# Patient Record
Sex: FEMALE | Race: WHITE | Employment: OTHER | URBAN - METROPOLITAN AREA
[De-identification: names, ages, dates, MRNs, and addresses within clinical notes are randomized per-mention and may not be internally consistent; named-entity substitution may affect disease eponyms.]

---

## 2017-03-13 ENCOUNTER — HOSPITAL ENCOUNTER (OUTPATIENT)
Age: 72
Setting detail: SPECIMEN
Discharge: HOME | End: 2017-03-13
Payer: MEDICARE

## 2017-03-13 LAB
-: NORMAL
AMORPHOUS: NORMAL
BACTERIA: NORMAL
BILIRUBIN URINE: NEGATIVE
CASTS UA: NORMAL /LPF (ref 0–8)
COLOR: YELLOW
COMMENT UA: ABNORMAL
CRYSTALS, UA: NORMAL /HPF
EPITHELIAL CELLS UA: NORMAL /HPF (ref 0–5)
GLUCOSE URINE: ABNORMAL
KETONES, URINE: NEGATIVE
LEUKOCYTE ESTERASE, URINE: ABNORMAL
MUCUS: NORMAL
NITRITE, URINE: NEGATIVE
OTHER OBSERVATIONS UA: NORMAL
PH UA: 5 (ref 5–8)
PROTEIN UA: NEGATIVE
RBC UA: NORMAL /HPF (ref 0–4)
RENAL EPITHELIAL, UA: NORMAL /HPF
SPECIFIC GRAVITY UA: 1.01 (ref 1–1.03)
TRICHOMONAS: NORMAL
TURBIDITY: ABNORMAL
URINE HGB: NEGATIVE
UROBILINOGEN, URINE: NORMAL
WBC UA: NORMAL /HPF (ref 0–5)
YEAST: NORMAL

## 2017-03-16 LAB
CULTURE: ABNORMAL
CULTURE: ABNORMAL
Lab: ABNORMAL
ORGANISM: ABNORMAL
SPECIMEN DESCRIPTION: ABNORMAL
STATUS: ABNORMAL

## 2020-02-26 ENCOUNTER — HOSPITAL ENCOUNTER (OUTPATIENT)
Dept: PREADMISSION TESTING | Age: 75
Discharge: HOME | End: 2020-03-01
Payer: MEDICARE

## 2020-02-26 VITALS
SYSTOLIC BLOOD PRESSURE: 135 MMHG | TEMPERATURE: 98.2 F | HEART RATE: 68 BPM | OXYGEN SATURATION: 98 % | DIASTOLIC BLOOD PRESSURE: 83 MMHG | RESPIRATION RATE: 16 BRPM | HEIGHT: 60 IN | WEIGHT: 190 LBS | BODY MASS INDEX: 37.3 KG/M2

## 2020-02-26 LAB
ABSOLUTE EOS #: 0.2 K/UL (ref 0–0.4)
ABSOLUTE IMMATURE GRANULOCYTE: ABNORMAL K/UL (ref 0–0.3)
ABSOLUTE LYMPH #: 1.3 K/UL (ref 1–4.8)
ABSOLUTE MONO #: 0.7 K/UL (ref 0.1–1.3)
ALBUMIN SERPL-MCNC: 4.4 G/DL (ref 3.5–5.2)
ALBUMIN/GLOBULIN RATIO: ABNORMAL (ref 1–2.5)
ALP BLD-CCNC: 136 U/L (ref 35–104)
ALT SERPL-CCNC: 9 U/L (ref 5–33)
AMYLASE: 69 U/L (ref 28–100)
ANION GAP SERPL CALCULATED.3IONS-SCNC: 13 MMOL/L (ref 9–17)
AST SERPL-CCNC: 16 U/L
BASOPHILS # BLD: 1 % (ref 0–2)
BASOPHILS ABSOLUTE: 0.1 K/UL (ref 0–0.2)
BILIRUB SERPL-MCNC: 0.4 MG/DL (ref 0.3–1.2)
BILIRUBIN DIRECT: 0.1 MG/DL
BILIRUBIN, INDIRECT: 0.3 MG/DL (ref 0–1)
BUN BLDV-MCNC: 26 MG/DL (ref 8–23)
BUN/CREAT BLD: ABNORMAL (ref 9–20)
CALCIUM SERPL-MCNC: 9.7 MG/DL (ref 8.6–10.4)
CHLORIDE BLD-SCNC: 99 MMOL/L (ref 98–107)
CO2: 23 MMOL/L (ref 20–31)
CREAT SERPL-MCNC: 1.32 MG/DL (ref 0.5–0.9)
DIFFERENTIAL TYPE: ABNORMAL
EOSINOPHILS RELATIVE PERCENT: 2 % (ref 0–4)
GFR AFRICAN AMERICAN: 48 ML/MIN
GFR NON-AFRICAN AMERICAN: 39 ML/MIN
GFR SERPL CREATININE-BSD FRML MDRD: ABNORMAL ML/MIN/{1.73_M2}
GFR SERPL CREATININE-BSD FRML MDRD: ABNORMAL ML/MIN/{1.73_M2}
GLOBULIN: ABNORMAL G/DL (ref 1.5–3.8)
GLUCOSE BLD-MCNC: 188 MG/DL (ref 70–99)
HCT VFR BLD CALC: 36.9 % (ref 36–46)
HEMOGLOBIN: 12.3 G/DL (ref 12–16)
IMMATURE GRANULOCYTES: ABNORMAL %
LIPASE: 54 U/L (ref 13–60)
LYMPHOCYTES # BLD: 14 % (ref 24–44)
MCH RBC QN AUTO: 29.6 PG (ref 26–34)
MCHC RBC AUTO-ENTMCNC: 33.2 G/DL (ref 31–37)
MCV RBC AUTO: 89.1 FL (ref 80–100)
MONOCYTES # BLD: 7 % (ref 1–7)
NRBC AUTOMATED: ABNORMAL PER 100 WBC
PDW BLD-RTO: 13.4 % (ref 11.5–14.9)
PLATELET # BLD: 406 K/UL (ref 150–450)
PLATELET ESTIMATE: ABNORMAL
PMV BLD AUTO: 7.6 FL (ref 6–12)
POTASSIUM SERPL-SCNC: 4.8 MMOL/L (ref 3.7–5.3)
RBC # BLD: 4.14 M/UL (ref 4–5.2)
RBC # BLD: ABNORMAL 10*6/UL
SEG NEUTROPHILS: 76 % (ref 36–66)
SEGMENTED NEUTROPHILS ABSOLUTE COUNT: 7.3 K/UL (ref 1.3–9.1)
SODIUM BLD-SCNC: 135 MMOL/L (ref 135–144)
TOTAL PROTEIN: 7.8 G/DL (ref 6.4–8.3)
WBC # BLD: 9.6 K/UL (ref 3.5–11)
WBC # BLD: ABNORMAL 10*3/UL

## 2020-02-26 PROCEDURE — 36415 COLL VENOUS BLD VENIPUNCTURE: CPT

## 2020-02-26 PROCEDURE — 82150 ASSAY OF AMYLASE: CPT

## 2020-02-26 PROCEDURE — 85025 COMPLETE CBC W/AUTO DIFF WBC: CPT

## 2020-02-26 PROCEDURE — 83690 ASSAY OF LIPASE: CPT

## 2020-02-26 PROCEDURE — 93005 ELECTROCARDIOGRAM TRACING: CPT | Performed by: SURGERY

## 2020-02-26 PROCEDURE — 80076 HEPATIC FUNCTION PANEL: CPT

## 2020-02-26 PROCEDURE — 80048 BASIC METABOLIC PNL TOTAL CA: CPT

## 2020-02-26 RX ORDER — TRIAMTERENE AND HYDROCHLOROTHIAZIDE 75; 50 MG/1; MG/1
1 TABLET ORAL DAILY
COMMUNITY
End: 2021-09-20

## 2020-02-26 RX ORDER — ESCITALOPRAM OXALATE 20 MG/1
20 TABLET ORAL DAILY
COMMUNITY

## 2020-02-26 RX ORDER — AMLODIPINE BESYLATE AND BENAZEPRIL HYDROCHLORIDE 5; 10 MG/1; MG/1
1 CAPSULE ORAL EVERY EVENING
COMMUNITY

## 2020-02-26 RX ORDER — MULTIVIT-MIN/IRON/FOLIC ACID/K 18-600-40
1 CAPSULE ORAL DAILY
COMMUNITY
End: 2021-09-20

## 2020-02-26 RX ORDER — METOPROLOL TARTRATE 100 MG/1
100 TABLET ORAL DAILY
COMMUNITY

## 2020-02-26 RX ORDER — CELECOXIB 200 MG/1
400 CAPSULE ORAL 2 TIMES DAILY PRN
COMMUNITY
End: 2021-09-20

## 2020-02-26 RX ORDER — MODAFINIL 200 MG/1
200 TABLET ORAL DAILY
COMMUNITY

## 2020-02-26 RX ORDER — PANTOPRAZOLE SODIUM 40 MG/1
40 TABLET, DELAYED RELEASE ORAL DAILY
COMMUNITY
End: 2021-09-20

## 2020-02-26 SDOH — HEALTH STABILITY: MENTAL HEALTH: HOW OFTEN DO YOU HAVE A DRINK CONTAINING ALCOHOL?: NEVER

## 2020-02-26 NOTE — H&P
HISTORY and [address]       NAME:  Jessica Minor  MRN: [id number]   YOB: 1945   Date: 2/26/2020   Age: 70 y.o. Gender: female       COMPLAINT AND PRESENT HISTORY:     Jessica Minor is 70 y.o.,   female, undergoing preadmission testing for Robotic Laparoscopic Cholecystectomy. Pt has Cholelithiasis. Pt C/O of RUQ and Epigastric pains,The pain is sharp in character, not related to meals or bowel movements. The pain radiates to the Rt back but not to the shoulder. Symptoms started 1 month ago. Pt has intolerance to greasy and spicy foods. Pt also C/O of No Vomiting, Nausea . Occ. Diarrhea, No constipation. No change in the color of the stools. No fever or chills. Gall Bladder Ultrasound showed Gall stones. No Hx of MRSA infections in the past.    PAST MEDICAL HISTORY     Past Medical History:   Diagnosis Date    Acid reflux disease     Arthritis     OA    Chronic cholecystitis     Depression     Diabetes mellitus ([facility] [address] 2003    type 2    Hard of hearing     wears bilateral hearing aids    Hx of bilateral cataract extraction     Hyperlipidemia     Hypertension     Multiple thyroid nodules     \"have been watching them for years\"    Sleep apnea     does not use machine    Vitamin D deficiency     Wears glasses     for reading       SURGICAL HISTORY       Past Surgical History:   Procedure Laterality Date    CERVICAL SPINE SURGERY       anterior  with hardware C4-C5    COLONOSCOPY      x 2    EYE SURGERY Bilateral     monovision    FINGER TRIGGER RELEASE      right index, middle finger, left middle finger    HYSTERECTOMY  1989    with BSO    KNEE ARTHROSCOPY Right     NEPHROSTOMY  1990    and removal    URETER STENT PLACEMENT Right 1989       FAMILY HISTORY     History reviewed. No pertinent family history.     SOCIAL HISTORY       Social History     Socioeconomic History    Marital status:      Spouse name: None    Number of children: None    Years of education: None    Highest education level: None   Occupational History    None   Social Needs    Financial resource strain: None    Food insecurity:     Worry: None     Inability: None    Transportation needs:     Medical: None     Non-medical: None   Tobacco Use    Smoking status: Never Smoker    Smokeless tobacco: Never Used   Substance and Sexual Activity    Alcohol use: Never     Frequency: Never    Drug use: Never    Sexual activity: None   Lifestyle    Physical activity:     Days per week: None     Minutes per session: None    Stress: None   Relationships    Social connections:     Talks on phone: None     Gets together: None     Attends Druze service: None     Active member of club or organization: None     Attends meetings of clubs or organizations: None     Relationship status: None    Intimate partner violence:     Fear of current or ex partner: None     Emotionally abused: None     Physically abused: None     Forced sexual activity: None   Other Topics Concern    None   Social History Narrative    None           REVIEW OF SYSTEMS      No Known Allergies    Current Outpatient Medications on File Prior to Encounter   Medication Sig Dispense Refill    celecoxib (CELEBREX) 200 MG capsule Take 400 mg by mouth 2 times daily as needed for Pain      amLODIPine-benazepril (LOTREL) 5-10 MG per capsule Take 1 capsule by mouth every evening      triamterene-hydrochlorothiazide (MAXZIDE) 75-50 MG per tablet Take 1 tablet by mouth daily      metoprolol (LOPRESSOR) 100 MG tablet Take 100 mg by mouth daily      modafinil (PROVIGIL) 200 MG tablet Take 200 mg by mouth daily.       pantoprazole (PROTONIX) 40 MG tablet Take 40 mg by mouth daily      escitalopram (LEXAPRO) 20 MG tablet Take 20 mg by mouth daily      Vitamin D, Cholecalciferol, 50 MCG (2000 UT) CAPS Take 1 tablet by mouth daily      buPROPion HCl (WELLBUTRIN PO) Take 150 mg by mouth daily      Insulin

## 2020-02-26 NOTE — H&P (VIEW-ONLY)
HISTORY and [address] [address]       NAME:  Bebe Vazquez  MRN: [id number]   YOB: 1945   Date: 2/26/2020   Age: 70 y.o. Gender: female       COMPLAINT AND PRESENT HISTORY:     Bebe Vazquez is 70 y.o.,   female, undergoing preadmission testing for Robotic Laparoscopic Cholecystectomy. Pt has Cholelithiasis. Pt C/O of RUQ and Epigastric pains,The pain is sharp in character, not related to meals or bowel movements. The pain radiates to the Rt back but not to the shoulder. Symptoms started 1 month ago. Pt has intolerance to greasy and spicy foods. Pt also C/O of No Vomiting, Nausea . Occ. Diarrhea, No constipation. No change in the color of the stools. No fever or chills. Gall Bladder Ultrasound showed Gall stones. No Hx of MRSA infections in the past.    PAST MEDICAL HISTORY     Past Medical History:   Diagnosis Date    Acid reflux disease     Arthritis     OA    Chronic cholecystitis     Depression     Diabetes mellitus ([facility] [address] 2003    type 2    Hard of hearing     wears bilateral hearing aids    Hx of bilateral cataract extraction     Hyperlipidemia     Hypertension     Multiple thyroid nodules     \"have been watching them for years\"    Sleep apnea     does not use machine    Vitamin D deficiency     Wears glasses     for reading       SURGICAL HISTORY       Past Surgical History:   Procedure Laterality Date    CERVICAL SPINE SURGERY       anterior  with hardware C4-C5    COLONOSCOPY      x 2    EYE SURGERY Bilateral     monovision    FINGER TRIGGER RELEASE      right index, middle finger, left middle finger    HYSTERECTOMY  1989    with BSO    KNEE ARTHROSCOPY Right     NEPHROSTOMY  1990    and removal    URETER STENT PLACEMENT Right 1989       FAMILY HISTORY     History reviewed. No pertinent family history.     SOCIAL HISTORY       Social History     Socioeconomic History    Marital status:      Spouse name: None    Number of children: None    Years of education: None    Highest education level: None   Occupational History    None   Social Needs    Financial resource strain: None    Food insecurity:     Worry: None     Inability: None    Transportation needs:     Medical: None     Non-medical: None   Tobacco Use    Smoking status: Never Smoker    Smokeless tobacco: Never Used   Substance and Sexual Activity    Alcohol use: Never     Frequency: Never    Drug use: Never    Sexual activity: None   Lifestyle    Physical activity:     Days per week: None     Minutes per session: None    Stress: None   Relationships    Social connections:     Talks on phone: None     Gets together: None     Attends Latter day service: None     Active member of club or organization: None     Attends meetings of clubs or organizations: None     Relationship status: None    Intimate partner violence:     Fear of current or ex partner: None     Emotionally abused: None     Physically abused: None     Forced sexual activity: None   Other Topics Concern    None   Social History Narrative    None           REVIEW OF SYSTEMS      No Known Allergies    Current Outpatient Medications on File Prior to Encounter   Medication Sig Dispense Refill    celecoxib (CELEBREX) 200 MG capsule Take 400 mg by mouth 2 times daily as needed for Pain      amLODIPine-benazepril (LOTREL) 5-10 MG per capsule Take 1 capsule by mouth every evening      triamterene-hydrochlorothiazide (MAXZIDE) 75-50 MG per tablet Take 1 tablet by mouth daily      metoprolol (LOPRESSOR) 100 MG tablet Take 100 mg by mouth daily      modafinil (PROVIGIL) 200 MG tablet Take 200 mg by mouth daily.       pantoprazole (PROTONIX) 40 MG tablet Take 40 mg by mouth daily      escitalopram (LEXAPRO) 20 MG tablet Take 20 mg by mouth daily      Vitamin D, Cholecalciferol, 50 MCG (2000 UT) CAPS Take 1 tablet by mouth daily      buPROPion HCl (WELLBUTRIN PO) Take 150 mg by mouth daily      Insulin

## 2020-02-27 ENCOUNTER — ANESTHESIA EVENT (OUTPATIENT)
Dept: OPERATING ROOM | Age: 75
End: 2020-02-27
Payer: MEDICARE

## 2020-02-27 LAB
EKG ATRIAL RATE: 68 BPM
EKG P AXIS: 57 DEGREES
EKG P-R INTERVAL: 182 MS
EKG Q-T INTERVAL: 402 MS
EKG QRS DURATION: 78 MS
EKG QTC CALCULATION (BAZETT): 427 MS
EKG T AXIS: 51 DEGREES
EKG VENTRICULAR RATE: 68 BPM

## 2020-02-27 RX ORDER — SODIUM CHLORIDE 9 MG/ML
INJECTION, SOLUTION INTRAVENOUS CONTINUOUS
Status: CANCELLED | OUTPATIENT
Start: 2020-02-27

## 2020-02-27 RX ORDER — SODIUM CHLORIDE 0.9 % (FLUSH) 0.9 %
10 SYRINGE (ML) INJECTION PRN
Status: CANCELLED | OUTPATIENT
Start: 2020-02-27

## 2020-02-27 RX ORDER — LIDOCAINE HYDROCHLORIDE 10 MG/ML
1 INJECTION, SOLUTION EPIDURAL; INFILTRATION; INTRACAUDAL; PERINEURAL
Status: CANCELLED | OUTPATIENT
Start: 2020-02-27 | End: 2020-02-27

## 2020-02-27 RX ORDER — SODIUM CHLORIDE 0.9 % (FLUSH) 0.9 %
10 SYRINGE (ML) INJECTION EVERY 12 HOURS SCHEDULED
Status: CANCELLED | OUTPATIENT
Start: 2020-02-27

## 2020-03-10 ENCOUNTER — HOSPITAL ENCOUNTER (OUTPATIENT)
Age: 75
Setting detail: OUTPATIENT SURGERY
Discharge: HOME | End: 2020-03-10
Attending: SURGERY | Admitting: SURGERY
Payer: MEDICARE

## 2020-03-10 ENCOUNTER — ANESTHESIA (OUTPATIENT)
Dept: OPERATING ROOM | Age: 75
End: 2020-03-10
Payer: MEDICARE

## 2020-03-10 VITALS
SYSTOLIC BLOOD PRESSURE: 145 MMHG | WEIGHT: 190 LBS | OXYGEN SATURATION: 97 % | DIASTOLIC BLOOD PRESSURE: 67 MMHG | HEIGHT: 60 IN | RESPIRATION RATE: 18 BRPM | BODY MASS INDEX: 37.3 KG/M2 | HEART RATE: 83 BPM | TEMPERATURE: 97.1 F

## 2020-03-10 VITALS
SYSTOLIC BLOOD PRESSURE: 170 MMHG | OXYGEN SATURATION: 99 % | RESPIRATION RATE: 19 BRPM | DIASTOLIC BLOOD PRESSURE: 77 MMHG | TEMPERATURE: 98.1 F

## 2020-03-10 LAB
GLUCOSE BLD-MCNC: 170 MG/DL (ref 65–105)
GLUCOSE BLD-MCNC: 205 MG/DL (ref 65–105)

## 2020-03-10 PROCEDURE — 2500000003 HC RX 250 WO HCPCS: Performed by: SURGERY

## 2020-03-10 PROCEDURE — 3700000001 HC ADD 15 MINUTES (ANESTHESIA): Performed by: SURGERY

## 2020-03-10 PROCEDURE — 7100000030 HC ASPR PHASE II RECOVERY - FIRST 15 MIN: Performed by: SURGERY

## 2020-03-10 PROCEDURE — 3600000009 HC SURGERY ROBOT BASE: Performed by: SURGERY

## 2020-03-10 PROCEDURE — 7100000031 HC ASPR PHASE II RECOVERY - ADDTL 15 MIN: Performed by: SURGERY

## 2020-03-10 PROCEDURE — 3600000019 HC SURGERY ROBOT ADDTL 15MIN: Performed by: SURGERY

## 2020-03-10 PROCEDURE — 2580000003 HC RX 258: Performed by: NURSE ANESTHETIST, CERTIFIED REGISTERED

## 2020-03-10 PROCEDURE — 82947 ASSAY GLUCOSE BLOOD QUANT: CPT

## 2020-03-10 PROCEDURE — 2709999900 HC NON-CHARGEABLE SUPPLY: Performed by: SURGERY

## 2020-03-10 PROCEDURE — 3700000000 HC ANESTHESIA ATTENDED CARE: Performed by: SURGERY

## 2020-03-10 PROCEDURE — 6360000002 HC RX W HCPCS: Performed by: SURGERY

## 2020-03-10 PROCEDURE — 2500000003 HC RX 250 WO HCPCS: Performed by: NURSE ANESTHETIST, CERTIFIED REGISTERED

## 2020-03-10 PROCEDURE — 7100000011 HC PHASE II RECOVERY - ADDTL 15 MIN: Performed by: SURGERY

## 2020-03-10 PROCEDURE — 88304 TISSUE EXAM BY PATHOLOGIST: CPT

## 2020-03-10 PROCEDURE — 7100000010 HC PHASE II RECOVERY - FIRST 15 MIN: Performed by: SURGERY

## 2020-03-10 PROCEDURE — 7100000000 HC PACU RECOVERY - FIRST 15 MIN: Performed by: SURGERY

## 2020-03-10 PROCEDURE — 2580000003 HC RX 258: Performed by: ANESTHESIOLOGY

## 2020-03-10 PROCEDURE — S2900 ROBOTIC SURGICAL SYSTEM: HCPCS | Performed by: SURGERY

## 2020-03-10 PROCEDURE — 7100000001 HC PACU RECOVERY - ADDTL 15 MIN: Performed by: SURGERY

## 2020-03-10 PROCEDURE — 6360000002 HC RX W HCPCS: Performed by: NURSE ANESTHETIST, CERTIFIED REGISTERED

## 2020-03-10 RX ORDER — CEPHALEXIN 500 MG/1
CAPSULE ORAL
Qty: 21 CAPSULE | Refills: 0 | Status: SHIPPED | OUTPATIENT
Start: 2020-03-10 | End: 2021-12-16 | Stop reason: ALTCHOICE

## 2020-03-10 RX ORDER — MORPHINE SULFATE 2 MG/ML
2 INJECTION, SOLUTION INTRAMUSCULAR; INTRAVENOUS EVERY 5 MIN PRN
Status: DISCONTINUED | OUTPATIENT
Start: 2020-03-10 | End: 2020-03-10 | Stop reason: HOSPADM

## 2020-03-10 RX ORDER — DIPHENHYDRAMINE HYDROCHLORIDE 50 MG/ML
12.5 INJECTION INTRAMUSCULAR; INTRAVENOUS
Status: DISCONTINUED | OUTPATIENT
Start: 2020-03-10 | End: 2020-03-10 | Stop reason: HOSPADM

## 2020-03-10 RX ORDER — OXYCODONE HYDROCHLORIDE AND ACETAMINOPHEN 5; 325 MG/1; MG/1
1 TABLET ORAL EVERY 6 HOURS PRN
Qty: 28 TABLET | Refills: 0 | Status: SHIPPED | OUTPATIENT
Start: 2020-03-10 | End: 2020-03-17

## 2020-03-10 RX ORDER — LIDOCAINE HYDROCHLORIDE 10 MG/ML
1 INJECTION, SOLUTION EPIDURAL; INFILTRATION; INTRACAUDAL; PERINEURAL
Status: DISCONTINUED | OUTPATIENT
Start: 2020-03-10 | End: 2020-03-10 | Stop reason: HOSPADM

## 2020-03-10 RX ORDER — HYDROCODONE BITARTRATE AND ACETAMINOPHEN 5; 325 MG/1; MG/1
2 TABLET ORAL PRN
Status: DISCONTINUED | OUTPATIENT
Start: 2020-03-10 | End: 2020-03-10 | Stop reason: HOSPADM

## 2020-03-10 RX ORDER — PROPOFOL 10 MG/ML
INJECTION, EMULSION INTRAVENOUS PRN
Status: DISCONTINUED | OUTPATIENT
Start: 2020-03-10 | End: 2020-03-10 | Stop reason: SDUPTHER

## 2020-03-10 RX ORDER — SODIUM CHLORIDE 0.9 % (FLUSH) 0.9 %
10 SYRINGE (ML) INJECTION EVERY 12 HOURS SCHEDULED
Status: DISCONTINUED | OUTPATIENT
Start: 2020-03-10 | End: 2020-03-10 | Stop reason: HOSPADM

## 2020-03-10 RX ORDER — ROCURONIUM BROMIDE 10 MG/ML
INJECTION, SOLUTION INTRAVENOUS PRN
Status: DISCONTINUED | OUTPATIENT
Start: 2020-03-10 | End: 2020-03-10 | Stop reason: SDUPTHER

## 2020-03-10 RX ORDER — FENTANYL CITRATE 50 UG/ML
50 INJECTION, SOLUTION INTRAMUSCULAR; INTRAVENOUS EVERY 5 MIN PRN
Status: DISCONTINUED | OUTPATIENT
Start: 2020-03-10 | End: 2020-03-10 | Stop reason: HOSPADM

## 2020-03-10 RX ORDER — NEOSTIGMINE METHYLSULFATE 5 MG/5 ML
SYRINGE (ML) INTRAVENOUS PRN
Status: DISCONTINUED | OUTPATIENT
Start: 2020-03-10 | End: 2020-03-10 | Stop reason: SDUPTHER

## 2020-03-10 RX ORDER — ONDANSETRON 2 MG/ML
4 INJECTION INTRAMUSCULAR; INTRAVENOUS
Status: DISCONTINUED | OUTPATIENT
Start: 2020-03-10 | End: 2020-03-10 | Stop reason: HOSPADM

## 2020-03-10 RX ORDER — METOCLOPRAMIDE HYDROCHLORIDE 5 MG/ML
10 INJECTION INTRAMUSCULAR; INTRAVENOUS
Status: DISCONTINUED | OUTPATIENT
Start: 2020-03-10 | End: 2020-03-10 | Stop reason: HOSPADM

## 2020-03-10 RX ORDER — SODIUM CHLORIDE 9 MG/ML
INJECTION, SOLUTION INTRAVENOUS CONTINUOUS
Status: DISCONTINUED | OUTPATIENT
Start: 2020-03-10 | End: 2020-03-10 | Stop reason: HOSPADM

## 2020-03-10 RX ORDER — ONDANSETRON 4 MG/1
TABLET, FILM COATED ORAL
Qty: 20 TABLET | Refills: 0 | Status: SHIPPED | OUTPATIENT
Start: 2020-03-10 | End: 2021-09-20

## 2020-03-10 RX ORDER — MEPERIDINE HYDROCHLORIDE 25 MG/ML
12.5 INJECTION INTRAMUSCULAR; INTRAVENOUS; SUBCUTANEOUS EVERY 5 MIN PRN
Status: DISCONTINUED | OUTPATIENT
Start: 2020-03-10 | End: 2020-03-10 | Stop reason: HOSPADM

## 2020-03-10 RX ORDER — HYDROCODONE BITARTRATE AND ACETAMINOPHEN 5; 325 MG/1; MG/1
1 TABLET ORAL PRN
Status: DISCONTINUED | OUTPATIENT
Start: 2020-03-10 | End: 2020-03-10 | Stop reason: HOSPADM

## 2020-03-10 RX ORDER — MIDAZOLAM HYDROCHLORIDE 1 MG/ML
INJECTION INTRAMUSCULAR; INTRAVENOUS PRN
Status: DISCONTINUED | OUTPATIENT
Start: 2020-03-10 | End: 2020-03-10 | Stop reason: SDUPTHER

## 2020-03-10 RX ORDER — LABETALOL 20 MG/4 ML (5 MG/ML) INTRAVENOUS SYRINGE
5 EVERY 10 MIN PRN
Status: DISCONTINUED | OUTPATIENT
Start: 2020-03-10 | End: 2020-03-10 | Stop reason: HOSPADM

## 2020-03-10 RX ORDER — ONDANSETRON 2 MG/ML
INJECTION INTRAMUSCULAR; INTRAVENOUS PRN
Status: DISCONTINUED | OUTPATIENT
Start: 2020-03-10 | End: 2020-03-10 | Stop reason: SDUPTHER

## 2020-03-10 RX ORDER — DEXAMETHASONE SODIUM PHOSPHATE 4 MG/ML
INJECTION, SOLUTION INTRA-ARTICULAR; INTRALESIONAL; INTRAMUSCULAR; INTRAVENOUS; SOFT TISSUE PRN
Status: DISCONTINUED | OUTPATIENT
Start: 2020-03-10 | End: 2020-03-10 | Stop reason: SDUPTHER

## 2020-03-10 RX ORDER — FENTANYL CITRATE 50 UG/ML
INJECTION, SOLUTION INTRAMUSCULAR; INTRAVENOUS PRN
Status: DISCONTINUED | OUTPATIENT
Start: 2020-03-10 | End: 2020-03-10 | Stop reason: SDUPTHER

## 2020-03-10 RX ORDER — GLYCOPYRROLATE 1 MG/5 ML
SYRINGE (ML) INTRAVENOUS PRN
Status: DISCONTINUED | OUTPATIENT
Start: 2020-03-10 | End: 2020-03-10 | Stop reason: SDUPTHER

## 2020-03-10 RX ORDER — FENTANYL CITRATE 50 UG/ML
25 INJECTION, SOLUTION INTRAMUSCULAR; INTRAVENOUS EVERY 5 MIN PRN
Status: DISCONTINUED | OUTPATIENT
Start: 2020-03-10 | End: 2020-03-10 | Stop reason: HOSPADM

## 2020-03-10 RX ORDER — HYDRALAZINE HYDROCHLORIDE 20 MG/ML
5 INJECTION INTRAMUSCULAR; INTRAVENOUS EVERY 10 MIN PRN
Status: DISCONTINUED | OUTPATIENT
Start: 2020-03-10 | End: 2020-03-10 | Stop reason: HOSPADM

## 2020-03-10 RX ORDER — SODIUM CHLORIDE 0.9 % (FLUSH) 0.9 %
10 SYRINGE (ML) INJECTION PRN
Status: DISCONTINUED | OUTPATIENT
Start: 2020-03-10 | End: 2020-03-10 | Stop reason: HOSPADM

## 2020-03-10 RX ORDER — EPHEDRINE SULFATE 50 MG/ML
INJECTION, SOLUTION INTRAVENOUS PRN
Status: DISCONTINUED | OUTPATIENT
Start: 2020-03-10 | End: 2020-03-10 | Stop reason: SDUPTHER

## 2020-03-10 RX ORDER — BUPIVACAINE HYDROCHLORIDE 5 MG/ML
INJECTION, SOLUTION EPIDURAL; INTRACAUDAL PRN
Status: DISCONTINUED | OUTPATIENT
Start: 2020-03-10 | End: 2020-03-10 | Stop reason: ALTCHOICE

## 2020-03-10 RX ORDER — LIDOCAINE HYDROCHLORIDE 10 MG/ML
INJECTION, SOLUTION EPIDURAL; INFILTRATION; INTRACAUDAL; PERINEURAL PRN
Status: DISCONTINUED | OUTPATIENT
Start: 2020-03-10 | End: 2020-03-10 | Stop reason: SDUPTHER

## 2020-03-10 RX ORDER — SODIUM CHLORIDE 9 MG/ML
INJECTION, SOLUTION INTRAVENOUS CONTINUOUS PRN
Status: DISCONTINUED | OUTPATIENT
Start: 2020-03-10 | End: 2020-03-10 | Stop reason: SDUPTHER

## 2020-03-10 RX ADMIN — FENTANYL CITRATE 50 MCG: 50 INJECTION, SOLUTION INTRAMUSCULAR; INTRAVENOUS at 15:29

## 2020-03-10 RX ADMIN — ONDANSETRON 4 MG: 2 INJECTION INTRAMUSCULAR; INTRAVENOUS at 15:36

## 2020-03-10 RX ADMIN — CEFAZOLIN 2 G: 10 INJECTION, POWDER, FOR SOLUTION INTRAVENOUS at 14:56

## 2020-03-10 RX ADMIN — Medication 0.6 MG: at 15:51

## 2020-03-10 RX ADMIN — FENTANYL CITRATE 50 MCG: 50 INJECTION, SOLUTION INTRAMUSCULAR; INTRAVENOUS at 15:56

## 2020-03-10 RX ADMIN — Medication 4 MG: at 15:51

## 2020-03-10 RX ADMIN — FENTANYL CITRATE 100 MCG: 50 INJECTION, SOLUTION INTRAMUSCULAR; INTRAVENOUS at 14:45

## 2020-03-10 RX ADMIN — EPHEDRINE SULFATE 10 MG: 50 INJECTION INTRAMUSCULAR; INTRAVENOUS; SUBCUTANEOUS at 15:14

## 2020-03-10 RX ADMIN — MIDAZOLAM 2 MG: 1 INJECTION INTRAMUSCULAR; INTRAVENOUS at 14:41

## 2020-03-10 RX ADMIN — SODIUM CHLORIDE: 9 INJECTION, SOLUTION INTRAVENOUS at 14:41

## 2020-03-10 RX ADMIN — ROCURONIUM BROMIDE 50 MG: 10 INJECTION, SOLUTION INTRAVENOUS at 14:45

## 2020-03-10 RX ADMIN — LIDOCAINE HYDROCHLORIDE 50 MG: 10 INJECTION, SOLUTION EPIDURAL; INFILTRATION; INTRACAUDAL; PERINEURAL at 14:45

## 2020-03-10 RX ADMIN — EPHEDRINE SULFATE 10 MG: 50 INJECTION INTRAMUSCULAR; INTRAVENOUS; SUBCUTANEOUS at 15:16

## 2020-03-10 RX ADMIN — PROPOFOL 170 MG: 10 INJECTION, EMULSION INTRAVENOUS at 14:45

## 2020-03-10 RX ADMIN — DEXAMETHASONE SODIUM PHOSPHATE 4 MG: 4 INJECTION, SOLUTION INTRA-ARTICULAR; INTRALESIONAL; INTRAMUSCULAR; INTRAVENOUS; SOFT TISSUE at 14:58

## 2020-03-10 RX ADMIN — SODIUM CHLORIDE: 9 INJECTION, SOLUTION INTRAVENOUS at 12:47

## 2020-03-10 ASSESSMENT — PULMONARY FUNCTION TESTS
PIF_VALUE: 18
PIF_VALUE: 18
PIF_VALUE: 25
PIF_VALUE: 3
PIF_VALUE: 25
PIF_VALUE: 26
PIF_VALUE: 18
PIF_VALUE: 26
PIF_VALUE: 2
PIF_VALUE: 26
PIF_VALUE: 18
PIF_VALUE: 26
PIF_VALUE: 2
PIF_VALUE: 26
PIF_VALUE: 18
PIF_VALUE: 25
PIF_VALUE: 19
PIF_VALUE: 2
PIF_VALUE: 24
PIF_VALUE: 25
PIF_VALUE: 10
PIF_VALUE: 26
PIF_VALUE: 18
PIF_VALUE: 1
PIF_VALUE: 19
PIF_VALUE: 10
PIF_VALUE: 26
PIF_VALUE: 2
PIF_VALUE: 4
PIF_VALUE: 10
PIF_VALUE: 23
PIF_VALUE: 26
PIF_VALUE: 24
PIF_VALUE: 18
PIF_VALUE: 18
PIF_VALUE: 25
PIF_VALUE: 10
PIF_VALUE: 4
PIF_VALUE: 24
PIF_VALUE: 18
PIF_VALUE: 18
PIF_VALUE: 2
PIF_VALUE: 25
PIF_VALUE: 18
PIF_VALUE: 18
PIF_VALUE: 1
PIF_VALUE: 3
PIF_VALUE: 2
PIF_VALUE: 26
PIF_VALUE: 26
PIF_VALUE: 25
PIF_VALUE: 26
PIF_VALUE: 26
PIF_VALUE: 25
PIF_VALUE: 25
PIF_VALUE: 26
PIF_VALUE: 1
PIF_VALUE: 25
PIF_VALUE: 18
PIF_VALUE: 2
PIF_VALUE: 23
PIF_VALUE: 1
PIF_VALUE: 1
PIF_VALUE: 26
PIF_VALUE: 26
PIF_VALUE: 1
PIF_VALUE: 18
PIF_VALUE: 25
PIF_VALUE: 18
PIF_VALUE: 2
PIF_VALUE: 26
PIF_VALUE: 1
PIF_VALUE: 23
PIF_VALUE: 21
PIF_VALUE: 2
PIF_VALUE: 19
PIF_VALUE: 10
PIF_VALUE: 25
PIF_VALUE: 18
PIF_VALUE: 26
PIF_VALUE: 18
PIF_VALUE: 2

## 2020-03-10 ASSESSMENT — PAIN SCALES - GENERAL
PAINLEVEL_OUTOF10: 3
PAINLEVEL_OUTOF10: 3
PAINLEVEL_OUTOF10: 0

## 2020-03-10 ASSESSMENT — PAIN DESCRIPTION - PAIN TYPE: TYPE: SURGICAL PAIN

## 2020-03-10 ASSESSMENT — PAIN DESCRIPTION - DESCRIPTORS: DESCRIPTORS: ACHING

## 2020-03-10 ASSESSMENT — ENCOUNTER SYMPTOMS: STRIDOR: 0

## 2020-03-10 ASSESSMENT — PAIN - FUNCTIONAL ASSESSMENT: PAIN_FUNCTIONAL_ASSESSMENT: 0-10

## 2020-03-10 ASSESSMENT — PAIN DESCRIPTION - LOCATION: LOCATION: ABDOMEN

## 2020-03-10 NOTE — ANESTHESIA POSTPROCEDURE EVALUATION
POST- ANESTHESIA EVALUATION       Pt Name: Swapnil Cabrera  MRN: [id number]  YOB: 1945  Date of evaluation: 3/10/2020  Time:  4:30 PM      BP (!) 158/73   Pulse 86   Temp 98.2 °F (36.8 °C) (Infrared)   Resp 20   Ht 5' (1.524 m)   Wt 190 lb (86.2 kg)   SpO2 95%   BMI 37.11 kg/m²      Consciousness Level  Awake  Cardiopulmonary Status  Stable  Pain Adequately Treated YES  Nausea / Vomiting  NO  Adequate Hydration  YES  Anesthesia Related Complications NONE      Electronically signed by Buster Garcia MD on 3/10/2020 at 4:30 PM       Department of Anesthesiology  Postprocedure Note    Patient: Swapnil Cabrera  MRN: [id number]  YOB: 1945  Date of evaluation: 3/10/2020  Time:  4:30 PM     Procedure Summary     Date:  03/10/20 Room / Location:  [address] [place] [address] [address]  / [facility]: [facility]    Anesthesia Start:  4055 Anesthesia Stop:  1611    Procedure:  CHOLECYSTECTOMY LAPAROSCOPIC ROBOTIC XI (N/A Abdomen) Diagnosis:  (CHRONIC CHOLECYSTITIS)    Surgeon:  Wild Jaeger MD Responsible Provider:  Sherron Jade MD    Anesthesia Type:  general ASA Status:  2          Anesthesia Type: general    Lainey Phase I: Lainey Score: 7    Lainey Phase II:      Last vitals: Reviewed and per EMR flowsheets.        Anesthesia Post Evaluation

## 2020-03-10 NOTE — ANESTHESIA PRE PROCEDURE
03/10/20 121/68   02/26/20 135/83       NPO Status: Time of last liquid consumption: 2100                        Time of last solid consumption: 1930                        Date of last liquid consumption: 03/09/20                        Date of last solid food consumption: 03/09/20    BMI:   Wt Readings from Last 3 Encounters:   03/10/20 190 lb (86.2 kg)   02/26/20 190 lb (86.2 kg)     Body mass index is 37.11 kg/m².     CBC:   Lab Results   Component Value Date    WBC 9.6 02/26/2020    RBC 4.14 02/26/2020    HGB 12.3 02/26/2020    HCT 36.9 02/26/2020    MCV 89.1 02/26/2020    RDW 13.4 02/26/2020     02/26/2020       CMP:   Lab Results   Component Value Date     02/26/2020    K 4.8 02/26/2020    CL 99 02/26/2020    CO2 23 02/26/2020    BUN 26 02/26/2020    CREATININE 1.32 02/26/2020    GFRAA 48 02/26/2020    LABGLOM 39 02/26/2020    GLUCOSE 188 02/26/2020    PROT 7.8 02/26/2020    CALCIUM 9.7 02/26/2020    BILITOT 0.40 02/26/2020    ALKPHOS 136 02/26/2020    AST 16 02/26/2020    ALT 9 02/26/2020       POC Tests:   Recent Labs     03/10/20  1243   POCGLU 205*       Coags: No results found for: PROTIME, INR, APTT    HCG (If Applicable): No results found for: PREGTESTUR, PREGSERUM, HCG, HCGQUANT     ABGs: No results found for: PHART, PO2ART, LUF6UFM, XTG9UVD, BEART, F9QMFZUJ     Type & Screen (If Applicable):  No results found for: [facility]    Anesthesia Evaluation  Patient summary reviewed and Nursing notes reviewed no history of anesthetic complications:   Airway: Mallampati: II  TM distance: >3 FB   Neck ROM: full  Mouth opening: > = 3 FB Dental: normal exam         Pulmonary: breath sounds clear to auscultation  (+) sleep apnea: on noncompliant,      (-) rhonchi, wheezes, rales and stridor                           Cardiovascular:    (+) hypertension: no interval change,     (-) murmur, weak pulses,  friction rub, systolic click, carotid bruit and  JVD    ECG reviewed  Rhythm: regular  Rate:

## 2020-03-10 NOTE — OP NOTE
Preoperative diagnosis: Chronic cholecystitis    Postoperative diagnosis: Same    Procedure: Robotic cholecystectomy    Surgeon: Dr. Adriel Su    Asst.: SALVADOR Hughes    Anesthesia: General    Preparation: Chloraprep    EBL: Less than 25 mL    Specimen: Gallbladder    Procedure: 62-year-old pleasant female with chronic cholecystitis was scheduled for robotic cholecystectomy. Informed consent was obtained. Preoperative antibiotics were given. Patient was taken to the operating room. General anesthesia was given. Abdomen was prepped and draped in the usual sterile fashion. Timeout was done. Supraumbilical incision was made. Hobert Glow port was introduced using the open technique without any difficulty. CO2 insufflation was carried out. Scope was introduced. Patient was placed in the reverse Trendelenburg position. 3 additional ports were placed under direct visualization. Robot was brought in. All the ports were docked. Camera and the ancillary instruments were advanced towards the target anatomy. Assistant grabbed the fundus of the gallbladder and that was retracted anterosuperiorly. Careful dissection was continued in the triangle of calot. Cystic duct was isolated was skeletonized. Cystic artery was isolated was skeletonized. Critical view was obtained. Anatomy was confirmed. After confirming the anatomy cystic duct and cystic artery were clipped and divided. Gallbladder was peeled off the liver bed using the hook cautery. Complete hemostasis was confirmed. All the clips were found to be intact. At this point robot was undocked. Gallbladder was retrieved and sent to pathology for further evaluation. All the port sites are visualized. No bleeding noted. All the ports were removed. Fascia and the skin was approximated in the usual fashion. Local anesthetic was infiltrated. Dermabond was applied. Steri-Strips applied. Sterile sling was applied.   Patient tolerated

## 2020-03-12 LAB — SURGICAL PATHOLOGY REPORT: NORMAL

## 2021-09-01 PROBLEM — M18.12 PRIMARY OSTEOARTHRITIS OF FIRST CARPOMETACARPAL JOINT OF LEFT HAND: Status: ACTIVE | Noted: 2021-09-01

## 2021-09-01 PROBLEM — K21.9 ESOPHAGEAL REFLUX: Status: ACTIVE | Noted: 2021-09-01

## 2021-09-01 PROBLEM — E03.9 MYXEDEMA HEART DISEASE: Status: ACTIVE | Noted: 2021-09-01

## 2021-09-01 PROBLEM — G47.30 INSOMNIA WITH SLEEP APNEA: Status: ACTIVE | Noted: 2021-09-01

## 2021-09-01 PROBLEM — M50.30 DEGENERATION OF CERVICAL INTERVERTEBRAL DISC: Status: ACTIVE | Noted: 2021-09-01

## 2021-09-01 PROBLEM — F33.41 RECURRENT MAJOR DEPRESSION IN PARTIAL REMISSION (HCC): Status: ACTIVE | Noted: 2021-09-01

## 2021-09-01 PROBLEM — M19.011 PRIMARY OSTEOARTHRITIS OF RIGHT SHOULDER: Status: ACTIVE | Noted: 2020-05-19

## 2021-09-01 PROBLEM — M19.90 ACUTE ARTHRITIS: Status: ACTIVE | Noted: 2021-09-01

## 2021-09-01 PROBLEM — E11.9 DIABETES MELLITUS (HCC): Status: ACTIVE | Noted: 2021-09-01

## 2021-09-01 PROBLEM — E78.5 HYPERLIPIDEMIA: Status: ACTIVE | Noted: 2021-09-01

## 2021-09-01 PROBLEM — Z79.4 ENCOUNTER FOR LONG-TERM (CURRENT) USE OF INSULIN (HCC): Status: ACTIVE | Noted: 2021-09-01

## 2021-09-01 PROBLEM — E66.01 SEVERE OBESITY (BMI 35.0-39.9) WITH COMORBIDITY (HCC): Status: ACTIVE | Noted: 2020-06-29

## 2021-09-01 PROBLEM — G89.29 CHRONIC IDIOPATHIC ANAL PAIN: Status: ACTIVE | Noted: 2021-09-01

## 2021-09-01 PROBLEM — K62.89 CHRONIC IDIOPATHIC ANAL PAIN: Status: ACTIVE | Noted: 2021-09-01

## 2021-09-01 PROBLEM — F03.93 PRESENILE DEMENTIA WITH DEPRESSIVE FEATURES (HCC): Status: ACTIVE | Noted: 2021-09-01

## 2021-09-01 PROBLEM — G47.00 INSOMNIA WITH SLEEP APNEA: Status: ACTIVE | Noted: 2021-09-01

## 2021-09-01 PROBLEM — I10 HYPERTENSION: Status: ACTIVE | Noted: 2021-09-01

## 2021-09-01 PROBLEM — E78.00 PURE HYPERCHOLESTEROLEMIA: Status: ACTIVE | Noted: 2021-09-01

## 2021-09-01 PROBLEM — I51.9 MYXEDEMA HEART DISEASE: Status: ACTIVE | Noted: 2021-09-01

## 2021-09-01 PROBLEM — E55.9 AVITAMINOSIS D: Status: ACTIVE | Noted: 2021-09-01

## 2021-09-02 ENCOUNTER — HOSPITAL ENCOUNTER (OUTPATIENT)
Dept: NUCLEAR MEDICINE | Age: 76
Discharge: HOME | End: 2021-09-04
Payer: MEDICARE

## 2021-09-02 ENCOUNTER — HOSPITAL ENCOUNTER (OUTPATIENT)
Dept: NON INVASIVE DIAGNOSTICS | Age: 76
Discharge: HOME | End: 2021-09-02
Payer: MEDICARE

## 2021-09-02 VITALS — HEIGHT: 60 IN | WEIGHT: 190 LBS | BODY MASS INDEX: 37.3 KG/M2

## 2021-09-02 DIAGNOSIS — R06.09 OTHER FORM OF DYSPNEA: ICD-10-CM

## 2021-09-02 LAB
LV EF: 70 %
LVEF MODALITY: NORMAL

## 2021-09-02 PROCEDURE — 78452 HT MUSCLE IMAGE SPECT MULT: CPT

## 2021-09-02 PROCEDURE — 3430000000 HC RX DIAGNOSTIC RADIOPHARMACEUTICAL: Performed by: FAMILY MEDICINE

## 2021-09-02 PROCEDURE — 2580000003 HC RX 258: Performed by: FAMILY MEDICINE

## 2021-09-02 PROCEDURE — 93017 CV STRESS TEST TRACING ONLY: CPT

## 2021-09-02 PROCEDURE — 6360000002 HC RX W HCPCS: Performed by: FAMILY MEDICINE

## 2021-09-02 PROCEDURE — A9500 TC99M SESTAMIBI: HCPCS | Performed by: FAMILY MEDICINE

## 2021-09-02 RX ORDER — SODIUM CHLORIDE 0.9 % (FLUSH) 0.9 %
10 SYRINGE (ML) INJECTION PRN
Status: DISCONTINUED | OUTPATIENT
Start: 2021-09-02 | End: 2021-09-05 | Stop reason: HOSPADM

## 2021-09-02 RX ORDER — SODIUM CHLORIDE 0.9 % (FLUSH) 0.9 %
5-40 SYRINGE (ML) INJECTION PRN
Status: ACTIVE | OUTPATIENT
Start: 2021-09-02 | End: 2021-09-02

## 2021-09-02 RX ORDER — ALBUTEROL SULFATE 90 UG/1
2 AEROSOL, METERED RESPIRATORY (INHALATION) PRN
Status: ACTIVE | OUTPATIENT
Start: 2021-09-02 | End: 2021-09-02

## 2021-09-02 RX ORDER — METOPROLOL TARTRATE 5 MG/5ML
5 INJECTION INTRAVENOUS EVERY 5 MIN PRN
Status: ACTIVE | OUTPATIENT
Start: 2021-09-02 | End: 2021-09-02

## 2021-09-02 RX ORDER — AMINOPHYLLINE DIHYDRATE 25 MG/ML
50 INJECTION, SOLUTION INTRAVENOUS PRN
Status: ACTIVE | OUTPATIENT
Start: 2021-09-02 | End: 2021-09-02

## 2021-09-02 RX ORDER — ATROPINE SULFATE 0.1 MG/ML
0.5 INJECTION INTRAVENOUS EVERY 5 MIN PRN
Status: ACTIVE | OUTPATIENT
Start: 2021-09-02 | End: 2021-09-02

## 2021-09-02 RX ORDER — SODIUM CHLORIDE 9 MG/ML
500 INJECTION, SOLUTION INTRAVENOUS CONTINUOUS PRN
Status: ACTIVE | OUTPATIENT
Start: 2021-09-02 | End: 2021-09-02

## 2021-09-02 RX ORDER — NITROGLYCERIN 0.4 MG/1
0.4 TABLET SUBLINGUAL EVERY 5 MIN PRN
Status: ACTIVE | OUTPATIENT
Start: 2021-09-02 | End: 2021-09-02

## 2021-09-02 RX ADMIN — REGADENOSON 0.4 MG: 0.08 INJECTION, SOLUTION INTRAVENOUS at 09:46

## 2021-09-02 RX ADMIN — TETRAKIS(2-METHOXYISOBUTYLISOCYANIDE)COPPER(I) TETRAFLUOROBORATE 10.6 MILLICURIE: 1 INJECTION, POWDER, LYOPHILIZED, FOR SOLUTION INTRAVENOUS at 07:29

## 2021-09-02 RX ADMIN — Medication 10 ML: at 07:29

## 2021-09-02 RX ADMIN — SODIUM CHLORIDE, PRESERVATIVE FREE 10 ML: 5 INJECTION INTRAVENOUS at 08:51

## 2021-09-02 RX ADMIN — TETRAKIS(2-METHOXYISOBUTYLISOCYANIDE)COPPER(I) TETRAFLUOROBORATE 34.7 MILLICURIE: 1 INJECTION, POWDER, LYOPHILIZED, FOR SOLUTION INTRAVENOUS at 09:47

## 2021-09-02 NOTE — PROGRESS NOTES
CST Lexiscan. Stress Tech performs patient preparation of physical comfort, review test procedures, pre-stress EKG. Lung Sounds clear t/o. Consent verified by pt. .  Educated patient on test procedure and possible side effects of Lexiscan as well as s/s to report. Cardiologist reviewed pre-test EKG and is present for test.  Patient tolerated test well with complaints of just feeling different, which resolved to baseline after test with caffeine. Start /79 HR 73  Stop /77 HR 77  EKG portion of testing is completed and negative, nuc. med. portion is still pending.

## 2021-09-02 NOTE — PROCEDURES
[address] [place], [place] [address]                              CARDIAC STRESS TEST    PATIENT NAME: Keny Headings                   :        1945  MED REC NO:   [id number]                              ROOM:  ACCOUNT NO:   [de-identified]                           ADMIT DATE: 2021  PROVIDER:     Tayler Jensen    DATE OF STUDY:  2021    TEST TYPE: LEXISCAN CARDIOLYTE STRESS TEST  INDICATION: SHORTNESS OF BREATH  REFERRING PHYSICIAN: Bonnie Raymundo    RESTING HEART RATE: 73 BEATS PER MINUTE  RESTING BLOOD PRESSURE: 162/79    MEDICATION(S) GIVEN: 0.4MG IV LEXISCAN  REASON FOR TERMINATION: MEDICATION INFUSION COMPLETE    RESTING EKG: NORMAL  STRESS HEART RESPONSE: NORMAL RESPONSE  BLOOD PRESSURE RESPONSE: APPROPRIATE  STRESS EKGs: NORMAL  CHEST DISCOMFORT: NO PAIN DURING STRESS  ISCHEMIC EKG CHANGES: NONE    EKG IMPRESSION: ELECTROCARDIOGRAPHICALLY NEGATIVE LEXISCAN STRESS TEST. RADIOISOTOPE RESULTS TO FOLLOW FROM THE DEPARTMENT OF NUCLEAR MEDICINE.     Alonzo Epperson    D: 2021 15:54:28       T: 2021 15:55:40     /HENNA  Job#: [id number]     Doc#: Unknown    CC:    (Retain this field even if not dictated or not decipherable)

## 2022-01-11 ENCOUNTER — HOSPITAL ENCOUNTER (OUTPATIENT)
Dept: PHYSICAL THERAPY | Age: 77
Setting detail: THERAPIES SERIES
Discharge: HOME | End: 2022-01-11
Payer: MEDICARE

## 2022-01-11 PROCEDURE — 97161 PT EVAL LOW COMPLEX 20 MIN: CPT

## 2022-01-11 PROCEDURE — 97140 MANUAL THERAPY 1/> REGIONS: CPT

## 2022-01-11 NOTE — CONSULTS
[facility]   Outpatient Physical Therapy  Physical Therapy Cervical Evaluation    Date:  2022  Patient: Michael Ballard  : 1945  MRN: [id number]  Physician: Betzy Newton MD Insurance: [facility]. Visits BMN and in accordance with Medicare guidelines. Medical Diagnosis: M54.2 (ICD-10-CM) - Cervicalgia  Rehab Codes: M54.2, R53.1, M25.60  Onset Date: 2021   Next 's appt: As needed    Subjective:   CC: R sided neck pain and stiffness with movement  HPI: Pt reports chronic ~1 year hx of R sided neck pain that radiates to the top of her shoulder, R ear and temporal region. Denies any radicular sx into R UE, but does report that she has chronic headaches as well. Denies any particular injury, but states that she noticed this pain gradually start coming on during her recovery and rehab after her R reverse TSA. Of note, pt does have a hx of an anterior cervical fusion at C4-C5 but states that her neck was doing remarkably well with no major issues until last January. Pt states that pain has progressively gotten worse over the past year and has a lot of pain and difficulty with moving her head. Has been managing with Tylenol and her neck brace as needed, but that pain in this region still persists. Unable to access cervical x-rays from last summer but pt is pending additional x-rays in the next couple weeks. Was recently referred to PT by her PCP for conservative mgmt.      PMHx: [] Unremarkable [x] Diabetes [x] HTN  [] Pacemaker   [] MI/Heart Problems [] Cancer [x] Arthritis [x] Other: thyroid nodules, ACDF C4-C5 , cholecystectomy, hysterectomy, nephrectomy, R rTSA               [x] Refer to full medical chart  In EPIC     Comorbidities:   [] Obesity [] Dialysis  [x] Other: HARD OF HEARING   [] Asthma/COPD [] Dementia [] Other:   [] Stroke [] Sleep apnea [] Other:   [] Vascular disease [] Rheumatic disease [] Other:     Preferred Language:   [x] English           [] Other:    Prior Imaging: No recent imaging    Previous Treatment: Attended outpatient PT at [facility] until January of last year after her R reverse total shoulder replacement with good results    Medications: [] Refer to full medical record [] None [x] Other: Tramadol and Tylenol PRN  Allergies:      [x] Refer to full medical record  [] None [] Other:    Function:  Hand Dominance  [x] Right  [] Left    Work Status: Retired    Prior Level of Function: Known degenerative changes in cervical spine but pt states that prior to last January, she was doing well with minimal issues with pain or mobility.     Pain:  [x] Yes  [] No Location: R side of neck (radiates into top of R shoulder, R ear, temporal region)  Pain Rating: (0-10 scale) 5/10  Pain altered Tx:  [] Yes  [x] No  Action:    Symptoms:  [] Improving [x] Worsening [] Same  Aggravating factors: Turning/moving head  Alleviating factors: Heat, massage, Tylenol, using neck brace      Functional Status Previous level of function Current level of function Comments   Bathing  [x] Independent  [] Deficit [] Independent  [x] Deficit Pain/difficulty turning head   Dress/grooming [x] Independent  [] Deficit [] Independent  [x] Deficit Pain/difficulty turning head   Driving [x] Independent  [] Deficit [] Independent  [x] Deficit Pain/difficulty turning head   Housekeeping/Meal Preparation [x] Independent  [] Deficit [] Independent  [x] Deficit Self-limits due to pain   Lifting/Carrying [x] Independent  [] Deficit [x] Independent  [] Deficit    Reaching [x] Independent  [] Deficit [] Independent  [x] Deficit Difficulty bending forward due to strain on cervical spine   Grasping [x] Independent  [] Deficit [x] Independent  [] Deficit    Writing/Typing [x] Independent  [] Deficit [x] Independent  [] Deficit    Other:  [] Independent  [] Deficit [] Independent  [] Deficit      Patient Goals/Rehab Expectations: \"Stop the neck pain\"      Objective:      OBSERVATION No Deficit Deficit Not Tested Comments   Forward Head [] [x] []    Rounded Shoulders [] [x] []    Kyphosis [] [x] []    Scap Height/Position [] [] [x]    Scoliosis/shift [] [] [x]    SH Rhythm [] [] [x]    NEUROLOGICAL       Reflexes [] [] []    Compression/Distraction [] [x] [] Diffuse mild hypomobility in all non-fused cervical segments   Sensation [x] [] []    FALL RISK? [x] []     Other: pt states that she's been using a cervical collar at home to help manage neck pain                                  Quin Fall Risk Assessment    Risk Factor Scale  Score   History of Falls [] Yes  [x] No 25  0 0   Secondary Diagnosis [] Yes  [x] No 15  0 0   Ambulatory Aid [] Furniture  [] Crutches/cane/walker  [x] None/bedrest/wheelchair/nurse 30  15  0 0   IV/Heparin Lock [] Yes  [x] No 20  0 0   Gait/Transferring [] Impaired  [] Weak  [x] Normal/bedrest/immobile 20  10  0 0   Mental Status [] Forgets limitations  [x] Oriented to own ability 15  0 0      Total: 0     Based on the Assessment score: check the appropriate box.     [x]  No intervention needed   Low =   Score of 0-24    []  Use standard prevention interventions Moderate =  Score of 24-44   [] Give patient handout and discuss fall prevention strategies   [] Establish goal of education for patient/family RE: fall prevention strategies    []  Use high risk prevention interventions High = Score of 45 and higher   [] Give patient handout and discuss fall prevention strategies   [] Establish goal of education for patient/family Re: fall prevention strategies   [] Discuss lifeline / other resources          Range of Motion  Left Range of Motion  Right Strength  Left Strength  Right   Cervical Flexion 90% 90% 3+/5 3+/5   Cervical Extension 50% 50% 3+/5 3+/5   Cervical Rotation 50% 25% 3+/5 3+/5   Cervical Side Bend 25% 25% 4/5 4/5   Shoulder Flexion WFL for all below Mildly limited with AROM (hx of rTSA) but overall WFL 4-/5 4-/5   Shoulder Abduction  Mildly limited with AROM (hx of cervical stabilizers and gross UE motor function. Pt has a hx of an anterior cervical fusion at C4-C5 but does demonstrate moderate restrictions in R cervical facet joints at non-fused segments, as well as notable soft tissue dysfunction in R cervical and scapular musculature. Skilled PT intervention is required to reduce pain, maximize cervical AROM, and improve functional strength/stability necessary to improve ADL tolerances and efficiency to her prior unrestricted level of function. Initiated light manual intervention to restricted areas today and discussed importance of gentle mobility in pain free ranges while performing ADLs in home, rather than keeping neck still and utilizing a neck brace as pt is doing currently. Good understanding noted and will provide a formal written home program at her first follow up visit. STG: (to be met in 6 treatments)  1. Pt will self report worst pain no greater than 2/10 in order to better tolerate ADLs/work activities with minimal dysfunction  2. Pt will improve AROM in cervical spine to 90% in all planes or greater in order to demonstrate ability to move/reach in all planes unrestricted at PLOF  LTG: (to be met in 10 treatments)  1. Pt will demonstrate improved cervical and UE strength to 4/5 or greater in order to demonstrate improved stability/strength necessary for unrestricted ADLs/work activities  2. Pt will decrease NDI to 20% impaired or less in order to demonstrate improved functional tolerances at PLOF with minimal restriction/dysfunction  3.  Pt will demonstrate independence with a long term HEP for continued progress/maintenance after completion of PT      Functional Assessment Used: NDI  Current Status Score: 52% impaired  Goal Status Score: 20% impaired    Evaluation Complexity:  History (Personal factors, comorbidities) [] 0 [] 1-2 [x] 3+   Exam (limitations, restrictions) [x] 1-2 [] 3 [] 4+   Clinical presentation (progression) [x] Stable [] Evolving  [] Unstable   Decision Making [x] Low [] Moderate [] High    [x] Low Complexity [] Moderate Complexity [] High Complexity     Rehab Potential:  [x] Good  [] Fair  [] Poor   Suggested Professional Referral:  [x] No  [] Yes:  Barriers to Goal Achievement[de-identified]  [x] No  [] Yes:  Domestic Concerns:  [x] No  [] Yes:    Pt. Education:  [x] Plans/Goals, Risks/Benefits discussed  [] Home exercise program  Method of Education: [x] Verbal  [x] Demo  [] Written    Comprehension of Education:  [x] Verbalizes understanding. [x] Demonstrates understanding. [] Needs Review. [] Demonstrates/verbalizes understanding of HEP/Ed previously given. Treatment Plan:  [x] Therapeutic Exercise   21842  [] Iontophoresis: 4 mg/mL Dexamethasone Sodium Phosphate  mAmin  70068   [x] Therapeutic Activity  20104 [] Vasopneumatic cold with compression  49242    [] Gait Training   34026 [] Ultrasound   29312   [x] Neuromuscular Re-education  32390 [] Electrical Stimulation Unattended  28997   [x] Manual Therapy  25333 [] Electrical Stimulation Attended  13796   [x] Instruction in HEP  [] Lumbar/Cervical Traction  36869   [] Aquatic Therapy   27909 [] Cold/hotpack    [] Massage   18088      [] Dry Needling, 1 or 2 muscles  57157   [] Biofeedback, first 15 minutes   02306  [] Biofeedback, additional 15 minutes   98857 [] Dry Needling, 3 or more muscles  16062          Frequency: 2x/week for 10 visits    Todays Treatment:  Modalities:   Precautions: Hx of ACDF at C4-C5 and R reverse TSA  Exercises:  Exercise Reps/ Time Weight/ Level Comments   Manual therapy 8'  Gentle MFR to R cervical paraspinals, B SOR, R upper trap         Patient education 5'  Advised against use of cervical collar due to it likely resulting in chronic stiffness and weakness/instability.  Encouraged gentle mobility of neck in pain free ranges               Other:    Specific Instructions for next treatment: Gentle manual intervention to address joint and soft tissue mobility in cervical region (no Hypervolt). Progressive upper quarter strengthening and postural strengthening as appropriate. Treatment Charges: Mins Units   [x] Evaluation       [x]  Low       []  Moderate       []  High 37 1   []  Modalities     []  Ther Exercise     [x]  Manual Therapy 8 1   [x]  Ther Activities 5 0   []  Aquatics     []  Neuromuscular     []  Gait Training     []  Dry Needling           1-2 muscles     []  Dry Needling           3 or more muscles     [] Vasocompression     []  Other       TOTAL TREATMENT TIME: 50    Time in: 3:25pm    Time Out: 4:15pm    Electronically signed by: Oh Martinez PT        Physician Signature:________________________________Date:__________________  By signing above or cosigning this note, I have reviewed this plan of care and certify a need for medically necessary rehabilitation services.      *PLEASE SIGN ABOVE AND FAX BACK ALL PAGES*

## 2022-01-14 ENCOUNTER — HOSPITAL ENCOUNTER (OUTPATIENT)
Facility: CLINIC | Age: 77
Discharge: HOME | End: 2022-01-16
Payer: MEDICARE

## 2022-01-14 ENCOUNTER — HOSPITAL ENCOUNTER (OUTPATIENT)
Dept: GENERAL RADIOLOGY | Facility: CLINIC | Age: 77
Discharge: HOME | End: 2022-01-16
Payer: MEDICARE

## 2022-01-14 DIAGNOSIS — M54.2 NECK PAIN: ICD-10-CM

## 2022-01-14 DIAGNOSIS — M50.30 DDD (DEGENERATIVE DISC DISEASE), CERVICAL: ICD-10-CM

## 2022-01-14 PROCEDURE — 72040 X-RAY EXAM NECK SPINE 2-3 VW: CPT

## 2022-01-18 ENCOUNTER — HOSPITAL ENCOUNTER (OUTPATIENT)
Dept: PHYSICAL THERAPY | Age: 77
Setting detail: THERAPIES SERIES
Discharge: HOME | End: 2022-01-18
Payer: MEDICARE

## 2022-01-18 PROCEDURE — 97140 MANUAL THERAPY 1/> REGIONS: CPT

## 2022-01-18 PROCEDURE — 97110 THERAPEUTIC EXERCISES: CPT

## 2022-01-18 NOTE — FLOWSHEET NOTE
[address] [facility] Out[facility] Physical Therapy   [address]   Phone: [phone]   Fax: [phone]    Physical Therapy Daily Treatment Note      Date:  2022  Patient Name:  Agnes Evans    :  1945  MRN: [id number]  Physician: Violet Mcknight MD     Insurance: [facility]. Visits BMN and in accordance with Medicare guidelines. Diagnosis: M54.2 (ICD-10-CM) - Cervicalgia     Onset Date: 2021   Next Dr. Dwayne Plummer: As needed  Visit# / total visits: 2/10  Cancels/No Shows: 0/0    Subjective:    Pain:  [x] Yes  [] No Location: R side of neck (radiates into R shoulder, R ear, temporal region) Pain Rating: (0-10 scale) 3/10  Pain altered Tx:  [] No  [] Yes  Action:  Comments: : Pt states that she's doing ok today with \"maybe slightly less pain\" than her previous visit. Rates pain at 3/10 currently but still has a lot of difficulty moving her head. Objective:  Modalities:   Precautions: Hx of ACDF at C5-C7 and R reverse TSA  Exercises:  Exercise Reps/ Time Weight/ Level Completed  Today Comments   Manual therapy 15'  x Gentle MFR to R cervical paraspinals, R upper trap/levator scap, and R SCM    Gentle manual distraction and mobs (PA and facet) at C3-C4    Supine MET to improve cervical rotation R/L   Seated chin tucks 2'' hold x 15  x Initial HEP   Seated scap retractions 2'' hold x 15  x Initial HEP   Gentle cervical AROM in all planes 10 ea direction  x R/L, flex/ext          Other:    Specific Instructions for next treatment: Gentle manual intervention to address joint and soft tissue mobility in cervical region (no Hypervolt). Progressive upper quarter strengthening and postural strengthening as appropriate      Assessment: [x] Progressing toward goals. [] No change.      [] Other:    [x] Patient would continue to benefit from skilled physical therapy services in order to: reduce pain, maximize cervical AROM, and improve functional strength/stability necessary to improve ADL tolerances and efficiency to her prior unrestricted level of function    1/18: Initiated gentle manual intervention focusing on R cervical region with good overall tolerance. Reinforced education provided at previous visit, encouraging gentle mobility in all planes as pt is still guarded and apprehensive to all movement. Initial HEP prescribed today with controlled cervical AROM in pain free ranges, as well as chin tucks and seated scap retractions for upper quarter stabilization. Improved cervical movement visually noted post session and will continue to progress as tolerated. STG: (to be met in 6 treatments)  1. Pt will self report worst pain no greater than 2/10 in order to better tolerate ADLs/work activities with minimal dysfunction  2. Pt will improve AROM in cervical spine to 90% in all planes or greater in order to demonstrate ability to move/reach in all planes unrestricted at PLOF  LTG: (to be met in 10 treatments)  1. Pt will demonstrate improved cervical and UE strength to 4/5 or greater in order to demonstrate improved stability/strength necessary for unrestricted ADLs/work activities  2. Pt will decrease NDI to 20% impaired or less in order to demonstrate improved functional tolerances at PLOF with minimal restriction/dysfunction  3. Pt will demonstrate independence with a long term HEP for continued progress/maintenance after completion of PT      Pt. Education:  [x] Yes  [] No  [] Reviewed Prior HEP/Ed  Method of Education: [x] Verbal  [x] Demo  [x] Written ([employer] Access Code [email or web address])  Comprehension of Education:  [x] Verbalizes understanding. [x] Demonstrates understanding. [] Needs review. [] Demonstrates/verbalizes HEP/Ed previously given. Plan: [x] Continue per plan of care.    [] Other:      Treatment Charges: Mins Units   []  Modalities     [x]  Ther Exercise 20 1   [x]  Manual Therapy 15 1   []  Ther Activities     []  Aquatics     []  Neuromuscular     [] Vasocompression     [] Gait Training     [] Dry needling        [] 1 or 2 muscles        [] 3 or more muscles     []  Other     Total Treatment time 35 2     Time In: 3:10pm            Time Out: 3:45pm    Electronically signed by:  Liang Flores PT

## 2022-01-20 ENCOUNTER — HOSPITAL ENCOUNTER (OUTPATIENT)
Dept: PHYSICAL THERAPY | Age: 77
Setting detail: THERAPIES SERIES
Discharge: HOME | End: 2022-01-20
Payer: MEDICARE

## 2022-01-20 NOTE — PROGRESS NOTES
[] [facility]) - [facility] & Therapy  [address]  [place]: [phone]   F: [phone]     Physical Therapy Cancel/No Show note    Date: 2022  Patient: Maikel Deleon  : 1945  MRN: [id number]    Visit Count: 2/10  Cancels/No Shows to date:     For today's appointment patient:    [x]  Cancelled    [] Rescheduled appointment    [] No-show     Reason given by patient:    [x]  Patient ill    []  Conflicting appointment    [] No transportation      [] Conflict with work    [] No reason given    [] Weather related    [] COVID-19    [] Other:      Comments:        [] Next appointment was confirmed    Electronically signed by: Rebecca Moss PTA

## 2022-01-25 ENCOUNTER — HOSPITAL ENCOUNTER (OUTPATIENT)
Dept: PHYSICAL THERAPY | Age: 77
Setting detail: THERAPIES SERIES
Discharge: HOME | End: 2022-01-25
Payer: MEDICARE

## 2022-01-25 NOTE — PROGRESS NOTES
[] [facility] - [facility] & Therapy  [address]  [place]: [phone]   F: [phone]     Physical Therapy Cancel/No Show note    Date: 2022  Patient: Dennis Carvalho  : 1945  MRN: [id number]    Visit Count: 2/10  Cancels/No Shows to date:     For today's appointment patient:    [x]  Cancelled    [] Rescheduled appointment    [] No-show     Reason given by patient:    []  Patient ill    []  Conflicting appointment    [] No transportation      [] Conflict with work    [x] No reason given- per      [] Weather related    [] COVID-23    [] Other:      Comments:        [] Next appointment was confirmed    Electronically signed by: Efrain Phelan PTA

## 2022-01-27 ENCOUNTER — HOSPITAL ENCOUNTER (OUTPATIENT)
Dept: PHYSICAL THERAPY | Age: 77
Setting detail: THERAPIES SERIES
Discharge: HOME | End: 2022-01-27
Payer: MEDICARE

## 2022-01-27 PROCEDURE — 97110 THERAPEUTIC EXERCISES: CPT

## 2022-01-27 PROCEDURE — 97140 MANUAL THERAPY 1/> REGIONS: CPT

## 2022-01-27 NOTE — FLOWSHEET NOTE
[address] Out[facility]   [address]   Phone: [phone]   Fax: [phone]    Physical Therapy Daily Treatment Note      Date:  2022  Patient Name:  Miguelina Ward    :  1945  MRN: [id number]  Physician: Zhou Johnson MD     Insurance: [facility]. Visits BMN and in accordance with Medicare guidelines. Diagnosis: M54.2 (ICD-10-CM) - Cervicalgia     Onset Date: 2021   Next Dr. Kahlil Kaur: As needed  Visit# / total visits: 3/10  Cancels/No Shows: 2/0    Subjective:    Pain:  [x] Yes  [] No Location: R side of neck (radiates into R shoulder, R ear, temporal region) Pain Rating: (0-10 scale) 2-3/10  Pain altered Tx:  [] No  [] Yes  Action:  Comments: : Patient states she has been performing exercises at least 1x/day. Patient reports she has been sleeping well and has noticed improved tolerance to mobility since beginning therapy. Patient states she tries to be more aware of her posture. Objective:  Modalities:   Precautions: Hx of ACDF at C5-C7 and R reverse TSA  Exercises:  Exercise Reps/ Time Weight/ Level Completed  Today Comments   Manual therapy 15'  x Gentle MFR to R cervical paraspinals, R upper trap/levator scap, and R SCM   PROM UT stretch and rotation   Gentle manual distraction and mobs (PA and facet) at C3-C4    Supine MET to improve cervical rotation R/L   Cervical PROM        Seated chin tucks 2'' hold x 15  x Initial HEP   Seated scap retractions 2'' hold x 15  x Initial HEP   Gentle cervical AROM in all planes 10 ea direction  x R/L, flex/ext; patient reporting soreness in R eye with cervical rotation to R side. Prone T   x    Prone Rows   x    TB Rows/extension 10x Red/yellow x                  Other:    Specific Instructions for next treatment: Gentle manual intervention to address joint and soft tissue mobility in cervical region (no Hypervolt).  Progressive upper quarter strengthening and postural strengthening as appropriate      Assessment: [x] Progressing toward goals. [] No change. [] Other:    [x] Patient would continue to benefit from skilled physical therapy services in order to: reduce pain, maximize cervical AROM, and improve functional strength/stability necessary to improve ADL tolerances and efficiency to her prior unrestricted level of function    1/27: Began session with manual therapy and gentle stretching without increase in pain. Added prone T and rows to improve scapr retraction and postural strength along with TB exercises. Cues as needed to correct pt's posture and technique during exercises throughout session. Patient reporting mild \"soreness\" in R eye with some cervical rotation that subsided with rest.        STG: (to be met in 6 treatments)  1. Pt will self report worst pain no greater than 2/10 in order to better tolerate ADLs/work activities with minimal dysfunction  2. Pt will improve AROM in cervical spine to 90% in all planes or greater in order to demonstrate ability to move/reach in all planes unrestricted at PLOF  LTG: (to be met in 10 treatments)  1. Pt will demonstrate improved cervical and UE strength to 4/5 or greater in order to demonstrate improved stability/strength necessary for unrestricted ADLs/work activities  2. Pt will decrease NDI to 20% impaired or less in order to demonstrate improved functional tolerances at PLOF with minimal restriction/dysfunction  3. Pt will demonstrate independence with a long term HEP for continued progress/maintenance after completion of PT      Pt. Education:  [x] Yes  [] No  [] Reviewed Prior HEP/Ed  Method of Education: [x] Verbal  [x] Demo  [x] Written ([employer] Access Code [email or web address])  Comprehension of Education:  [x] Verbalizes understanding. [x] Demonstrates understanding. [] Needs review. [] Demonstrates/verbalizes HEP/Ed previously given. Plan: [x] Continue per plan of care.    [] Other:      Treatment Charges: Mins Units   [] Modalities     [x]  Ther Exercise 30 2   [x]  Manual Therapy 15 1   []  Ther Activities     []  Aquatics     []  Neuromuscular     [] Vasocompression     [] Gait Training     [] Dry needling        [] 1 or 2 muscles        [] 3 or more muscles     []  Other     Total Treatment time 45 3     Time In: 9066           Time Out: 1600    Electronically signed by:  Anoop Samuels PTA

## 2022-02-01 ENCOUNTER — HOSPITAL ENCOUNTER (OUTPATIENT)
Dept: PHYSICAL THERAPY | Age: 77
Setting detail: THERAPIES SERIES
Discharge: HOME | End: 2022-02-01
Payer: MEDICARE

## 2022-02-01 PROCEDURE — 97140 MANUAL THERAPY 1/> REGIONS: CPT

## 2022-02-01 PROCEDURE — 97110 THERAPEUTIC EXERCISES: CPT

## 2022-02-01 NOTE — FLOWSHEET NOTE
[address] [facility] Outpatient Physical Therapy   [address] [facility] [address]   Phone: [phone]   Fax: [phone]    Physical Therapy Daily Treatment Note      Date:  2022  Patient Name:  Anna Alexis    :  1945  MRN: [id number]  Physician: Kelton Hernandez MD     Insurance: [facility]. Visits BMN and in accordance with Medicare guidelines. Diagnosis: M54.2 (ICD-10-CM) - Cervicalgia     Onset Date: 2021   Next Dr. Douglas Alexis: As needed  Visit# / total visits: 4/10  Cancels/No Shows: 2/0    Subjective:    Pain:  [x] Yes  [] No Location: R side of neck (radiates into R shoulder, R ear, temporal region) Pain Rating: (0-10 scale) 2-310  Pain altered Tx:  [] No  [] Yes  Action:  Comments: : Pt states that she was fairly sore in her shoulders for 2-3 days after her previous appt but otherwise is doing ok. Feels like she's made some progress since starting therapy but still feels stiff rotating her head to the R and still feels a fair amount of soreness at the side of her neck. Objective:  Modalities:   Precautions: Hx of ACDF at C5-C7 and R reverse TSA  Exercises:  Exercise Reps/ Time Weight/ Level Completed  Today Comments   Manual therapy 15'  x Gentle MFR to R cervical paraspinals, R upper trap/levator scap, and R SCM   PROM UT stretch and rotation   Gentle manual distraction and mobs (PA and facet) at C3-C4    Supine MET to improve cervical rotation R   Cervical PROM        Seated chin tucks 2'' hold x 15  x Initial HEP   Seated scap retractions 2'' hold x 15  x Initial HEP   Gentle cervical AROM in all planes 10 ea direction  x R/L, flex/ext; patient reporting soreness in R eye with cervical rotation to R side.    Prone T 10x      Prone Rows 10x      Banded rows 10x Red     Banded shoulder ext 10x Yellow     Banded bilateral ER at 0 deg 10 reps x 2 Yellow x Seated emphasizing upright posture   UBE 5' L1 x Reverse   Other:    Specific Instructions for next treatment: Gentle manual intervention to address joint and soft tissue mobility in cervical region (no Hypervolt). Progressive upper quarter strengthening and postural strengthening as appropriate      Assessment: [x] Progressing toward goals. [] No change. [] Other:    [x] Patient would continue to benefit from skilled physical therapy services in order to: reduce pain, maximize cervical AROM, and improve functional strength/stability necessary to improve ADL tolerances and efficiency to her prior unrestricted level of function    2/1: Pt continues to demonstrate R sided cervical tightness and discomfort with more restriction rotating to R, although improving steadily in PT. Majority of sx and soreness localized primarily to R SCM today which was emphasized during manual intervention. Pt reported excessive soreness for 3 days after some the resisted activities added at previous visit, so limited the amount of resisted strengthening today to tolerance. Did advise pt that post-exercise soreness is normal and to be expected, so long as it's not excessive. Heavy cues required for technique during seat chin tucks as pt demonstrated tendency to protract instead of retract cervical spine. Was able to correct with cueing but may require additional review. Difficulty also noted with the addition of banded bilateral ER with more difficulty controlling motion with R UE. Finished session with retro revolutions on UBE with pt reporting good relief in soreness in R UE and lateral neck post-exercise. STG: (to be met in 6 treatments)  1. Pt will self report worst pain no greater than 2/10 in order to better tolerate ADLs/work activities with minimal dysfunction  2. Pt will improve AROM in cervical spine to 90% in all planes or greater in order to demonstrate ability to move/reach in all planes unrestricted at PLOF  LTG: (to be met in 10 treatments)  1.  Pt will demonstrate improved cervical and UE strength to 4/5 or greater in order to demonstrate improved stability/strength necessary for unrestricted ADLs/work activities  2. Pt will decrease NDI to 20% impaired or less in order to demonstrate improved functional tolerances at PLOF with minimal restriction/dysfunction  3. Pt will demonstrate independence with a long term HEP for continued progress/maintenance after completion of PT      Pt. Education:  [x] Yes  [] No  [] Reviewed Prior HEP/Ed  Method of Education: [x] Verbal  [x] Demo  [x] Written (IPexpert Access Code [email or web address])  Comprehension of Education:  [x] Verbalizes understanding. [x] Demonstrates understanding. [] Needs review. [] Demonstrates/verbalizes HEP/Ed previously given. Plan: [x] Continue per plan of care.    [] Other:      Treatment Charges: Mins Units   []  Modalities     [x]  Ther Exercise 24 2   [x]  Manual Therapy 15 1   []  Ther Activities     []  Aquatics     []  Neuromuscular     [] Vasocompression     [] Gait Training     [] Dry needling        [] 1 or 2 muscles        [] 3 or more muscles     []  Other     Total Treatment time 39 3     Time In: 3:15pm           Time Out: 3:54pm    Electronically signed by:  Manuela Lopez PT

## 2022-02-03 ENCOUNTER — HOSPITAL ENCOUNTER (OUTPATIENT)
Dept: PHYSICAL THERAPY | Age: 77
Setting detail: THERAPIES SERIES
End: 2022-02-03
Payer: MEDICARE

## 2022-02-08 ENCOUNTER — HOSPITAL ENCOUNTER (OUTPATIENT)
Dept: PHYSICAL THERAPY | Age: 77
Setting detail: THERAPIES SERIES
Discharge: HOME | End: 2022-02-08
Payer: MEDICARE

## 2022-02-08 PROCEDURE — 97140 MANUAL THERAPY 1/> REGIONS: CPT

## 2022-02-08 PROCEDURE — 97110 THERAPEUTIC EXERCISES: CPT

## 2022-02-08 NOTE — FLOWSHEET NOTE
[facility] Outpatient Physical Therapy   [address]  [facility] [address]   Phone: [phone]   Fax: [phone]    Physical Therapy Daily Treatment Note      Date:  2022  Patient Name:  Anna Alexis    :  1945  MRN: [id number]  Physician: Kelton Hernandez MD     Insurance: [facility]. Visits BMN and in accordance with Medicare guidelines. Diagnosis: M54.2 (ICD-10-CM) - Cervicalgia     Onset Date: 2021   Next Dr. Douglas Alexis: As needed  Visit# / total visits: 5/10  Cancels/No Shows: 2/0    Subjective:    Pain:  [x] Yes  [] No Location: R side of neck (radiates into R shoulder, R ear, temporal region) Pain Rating: (0-10 scale) 2-3/10  Pain altered Tx:  [] No  [] Yes  Action:  Comments: : Patient reports since the last session her symptoms have improved significantly. Patient states she hasn't had a head ache or pain. Patient reports she recently got a new mattress monday and has been sleeping great since so wonders if that is reason behind pain improvements. Objective:  Modalities:   Precautions: Hx of ACDF at C5-C7 and R reverse TSA  Exercises:  Exercise Reps/ Time Weight/ Level Completed  Today Comments   Manual therapy 15'  x Gentle MFR to R cervical paraspinals, R upper trap/levator scap, and R SCM   PROM UT stretch and rotation   Gentle manual distraction and mobs (PA and facet) at C3-C4    Supine MET to improve cervical rotation R   Cervical PROM        Seated chin tucks 2'' hold x 15  x Initial HEP   Seated scap retractions 2'' hold x 15  x Initial HEP   Gentle cervical AROM in all planes 10 ea direction  x R/L, flex/ext; patient reporting soreness in R eye with cervical rotation to R side.    Seated I,T,Y 10x  X     Prone Rows 10x      Banded rows 10x Red x    Banded shoulder ext 10x Yellow x    Banded bilateral ER at 0 deg 10 reps x 2 Yellow x Seated emphasizing upright posture   UBE 5' L1 x Reverse   Other:    Specific Instructions for next treatment: Gentle manual intervention to address joint and soft tissue mobility in cervical region (no Hypervolt). Progressive upper quarter strengthening and postural strengthening as appropriate      Assessment: [x] Progressing toward goals. [] No change. [] Other:    [x] Patient would continue to benefit from skilled physical therapy services in order to: reduce pain, maximize cervical AROM, and improve functional strength/stability necessary to improve ADL tolerances and efficiency to her prior unrestricted level of function    2/8: Patient completed all exercises without increased pain symptoms noted, although compensation with UT activation noted throughout, consistent cueing required to prevent. Patient was tender to touch near occipital region and R SCM but only with pressure once pressure released there was no pain noted. STG: (to be met in 6 treatments)  1. Pt will self report worst pain no greater than 2/10 in order to better tolerate ADLs/work activities with minimal dysfunction  2. Pt will improve AROM in cervical spine to 90% in all planes or greater in order to demonstrate ability to move/reach in all planes unrestricted at PLOF  LTG: (to be met in 10 treatments)  1. Pt will demonstrate improved cervical and UE strength to 4/5 or greater in order to demonstrate improved stability/strength necessary for unrestricted ADLs/work activities  2. Pt will decrease NDI to 20% impaired or less in order to demonstrate improved functional tolerances at PLOF with minimal restriction/dysfunction  3. Pt will demonstrate independence with a long term HEP for continued progress/maintenance after completion of PT      Pt. Education:  [x] Yes  [] No  [] Reviewed Prior HEP/Ed  Method of Education: [x] Verbal  [x] Demo  [x] Written (Onit Access Code [email or web address])  Comprehension of Education:  [x] Verbalizes understanding. [x] Demonstrates understanding. [] Needs review.   [] Demonstrates/verbalizes HEP/Ed previously given.     Plan: [x] Continue per plan of care.    [] Other:      Treatment Charges: Mins Units   []  Modalities     [x]  Ther Exercise 22 1   [x]  Manual Therapy 15 1   []  Ther Activities     []  Aquatics     []  Neuromuscular     [] Vasocompression     [] Gait Training     [] Dry needling        [] 1 or 2 muscles        [] 3 or more muscles     []  Other     Total Treatment time 37 2     Time In: 3:20pm           Time Out: 3:57pm    Electronically signed by:  Huron Conception, PTA

## 2022-02-10 ENCOUNTER — HOSPITAL ENCOUNTER (OUTPATIENT)
Dept: PHYSICAL THERAPY | Age: 77
Setting detail: THERAPIES SERIES
Discharge: HOME | End: 2022-02-10
Payer: MEDICARE

## 2022-02-10 PROCEDURE — 97110 THERAPEUTIC EXERCISES: CPT

## 2022-02-10 NOTE — DISCHARGE SUMMARY
[address] [facility] Outpatient Physical Therapy   [address]  [facility] [address]   Phone: [phone]   Fax: [phone]    Physical Therapy Daily Treatment Note      Date:  2/10/2022  Patient Name:  Radha Freedman    :  1945  MRN: [id number]  Physician: Evaristo Yin MD     Insurance: [facility]. Visits BMN and in accordance with Medicare guidelines. Diagnosis: M54.2 (ICD-10-CM) - Cervicalgia     Onset Date: 2021   Next Dr. Gera Go: As needed  Visit# / total visits: 6/10  Cancels/No Shows: 2/0    Subjective:    Pain:  [x] Yes  [] No Location: R side of neck (radiates into R shoulder, R ear, temporal region) Pain Rating: (0-10 scale) 2-3/10  Pain altered Tx:  [] No  [] Yes  Action:  Comments: 2/10: Pt states that she was very sore after her previous visit and feels like the pressure with manual intervention was a little too firm. Otherwise, states that she has been doing remarkably well with improved movement in her cervical spine and no recent headaches which was her primary concern at the start of her PT episode. Formal progress note performed today for reporting period 22-2/10/22 to reassess all goals and determine readiness for d/c to an independent HEP. Objective:  All below objective measures updated 2/10      Range of Motion  Left Range of Motion  Right Strength  Left Strength  Right   Cervical Flexion 90% 90% 4/5 4/5   Cervical Extension 90% 90% 4/5 4/5   Cervical Rotation 90% 90% 4/5 4/5   Cervical Side Bend 90% 90% 4/5 4/5   Shoulder Flexion WFL for all below Mildly limited with AROM (hx of rTSA) but overall WFL 4/5 4/5   Shoulder Abduction   Mildly limited with AROM (hx of rTSA) but overall WFL 4/5 4/5   Shoulder Extension           Shoulder ER           Shoulder IR           Elbow Flexion     4/5 4/5   Elbow Extension     4-/5 4-/5   Pronation           Supination           Wrist Flexion     4+/5 4+/5   Wrist Extension     4+/5 4+/5         Modalities:   Precautions: Hx of ACDF at C5-C7 and R reverse TSA  Exercises:  Exercise Reps/ Time Weight/ Level Completed  Today Comments   Manual therapy 15'   Gentle MFR to R cervical paraspinals, R upper trap/levator scap, and R SCM   PROM UT stretch and rotation   Gentle manual distraction and mobs (PA and facet) at C3-C4    Supine MET to improve cervical rotation R   Cervical PROM        Seated chin tucks 2'' hold x 15   Initial HEP   Seated scap retractions 2'' hold x 15   Initial HEP   Gentle cervical AROM in all planes 10 ea direction   R/L, flex/ext; patient reporting soreness in R eye with cervical rotation to R side. Seated I,T,Y 10x      Prone Rows 10x      Banded rows 10x Yellow x HEP review   Banded shoulder ext 10x Yellow x HEP review   Banded bilateral ER at 0 deg 10x Yellow x HEP review   UBE 5' L1  Reverse   Re-evaluation of all subjective & objective measures + final HEP review 20'  x 2/10 by primary PT   Other:      Assessment: [x] Progressing toward goals. [] No change. [x] Other: Appropriate to d/c to independent HEP    [] Patient would continue to benefit from skilled physical therapy services in order to: reduce pain, maximize cervical AROM, and improve functional strength/stability necessary to improve ADL tolerances and efficiency to her prior unrestricted level of function    2/10: Pt has been seen for 6 PT visits to date for her chronic R sided neck pain and headaches. Overall, pt demonstrates excellent progress since previous assessment with decreased pain levels and improved self-reported functional tolerances with movement of cervical spine in all planes. Also demonstrates good improvement in all objective measure re-evaluation today, with AROM, strength and functional tolerances now [facility]. Reports no recent headaches, minimal tightness in cervical region, and demonstrates improved postural awareness compared to initial assessment.  At this time, pt is able to complete all ADLs and desired activities with minimal to no discomfort or limitations. All PT goals have been met and is appropriate to transition to an independent HEP for continued mgmt. Updated written home program instructions were provided with good understanding and dispensed yellow resistance band for home use. Anticipate good long term prognosis with continued self-mgmt and pt will be discharged from outpatient PT at this time. STG: (to be met in 6 treatments)  1. Pt will self report worst pain no greater than 2/10 in order to better tolerate ADLs/work activities with minimal dysfunction GOAL MET 2/10  2. Pt will improve AROM in cervical spine to 90% in all planes or greater in order to demonstrate ability to move/reach in all planes unrestricted at PLOF GOAL MET 2/10  LTG: (to be met in 10 treatments)  1. Pt will demonstrate improved cervical and UE strength to 4/5 or greater in order to demonstrate improved stability/strength necessary for unrestricted ADLs/work activities GOAL MET 2/10  2. Pt will decrease NDI to 20% impaired or less in order to demonstrate improved functional tolerances at PLOF with minimal restriction/dysfunction GOAL MET 2/10  3. Pt will demonstrate independence with a long term HEP for continued progress/maintenance after completion of PT GOAL MET 2/10      Pt. Education:  [x] Yes  [] No  [] Reviewed Prior HEP/Ed  Method of Education: [x] Verbal  [x] Demo  [x] Written (Mayank Aguilera Access Code [email or web address])- updated 2/10  Comprehension of Education:  [x] Verbalizes understanding. [x] Demonstrates understanding. [] Needs review. [] Demonstrates/verbalizes HEP/Ed previously given. Plan: [] Continue per plan of care.    [x] Other: Discharge to independent HEP      Treatment Charges: Mins Units   []  Modalities     [x]  Ther Exercise 29 2   []  Manual Therapy     []  Ther Activities     []  Aquatics     []  Neuromuscular     [] Vasocompression     [] Gait Training     [] Dry needling        [] 1 or 2 muscles        [] 3 or more muscles     []  Other     Total Treatment time 29 2     Time In: 3:16pm           Time Out: 3:45pm    Electronically signed by:  Edna Riley PT

## 2022-06-21 ENCOUNTER — OFFICE VISIT (OUTPATIENT)
Dept: PODIATRY | Age: 77
End: 2022-06-21
Payer: MEDICARE

## 2022-06-21 VITALS — BODY MASS INDEX: 36.12 KG/M2 | WEIGHT: 184 LBS | HEIGHT: 60 IN

## 2022-06-21 DIAGNOSIS — M79.605 PAIN IN BOTH LOWER EXTREMITIES: ICD-10-CM

## 2022-06-21 DIAGNOSIS — B35.1 DERMATOPHYTOSIS OF NAIL: ICD-10-CM

## 2022-06-21 DIAGNOSIS — L97.512 RIGHT FOOT ULCER, WITH FAT LAYER EXPOSED (HCC): ICD-10-CM

## 2022-06-21 DIAGNOSIS — M79.604 PAIN IN BOTH LOWER EXTREMITIES: ICD-10-CM

## 2022-06-21 DIAGNOSIS — E11.51 TYPE II DIABETES MELLITUS WITH PERIPHERAL CIRCULATORY DISORDER (HCC): Primary | ICD-10-CM

## 2022-06-21 PROCEDURE — 1123F ACP DISCUSS/DSCN MKR DOCD: CPT | Performed by: PODIATRIST

## 2022-06-21 PROCEDURE — 99204 OFFICE O/P NEW MOD 45 MIN: CPT | Performed by: PODIATRIST

## 2022-06-21 PROCEDURE — 11721 DEBRIDE NAIL 6 OR MORE: CPT | Performed by: PODIATRIST

## 2022-06-21 NOTE — PROGRESS NOTES
[address] [facility] PODIATRY  [address] [facility] [address]  Dept: [phone]    NEW PATIENT PROGRESS NOTE  Date of patient's visit: 6/21/2022  Patient's Name:  Deni Cruz YOB: 1945            Patient Care Team:  Micheline Huff as PCP - General        Chief Complaint   Patient presents with    New Patient    Foot Pain     left foot x 2 days, dropped a flower pot on her foot    Foot Injury    Toe Pain     3rd and 4th toes left foot    Wound Check     right foot in between 4th and 5th toes x 6 weeks    Ingrown Toenail     right great toe         Diabetes      HPI:   Deni Cruz is a 78 y.o. female who presents to the office today complaining of left foot injury/pain. Symptoms began 2 day(s) ago. Patient relates pain is Absent . Pain is rated 0 out of 10 and is described as none. Treatments prior to today's visit include: none. Currently denies F/C/N/V. Pt's primary care physician is Micheline Huff last seen June 2 2022. Patient states she dropped a flower pot on her left foot 2 days ago. She states she also has a ulcer in between her 4th and 5th toes on the right foot. She states it has been present for 6 weeks. Her pcp prescribed clotrimazole for her to apply on her ulcer. She also has been applying triple antibiotic cream. She states she only has pain when pressure is applied to the area. She also has an ingrown toenail on the right great toe. Patients last blood sugar reading was 152.     No Known Allergies    Past Medical History:   Diagnosis Date    Acid reflux disease     Arthritis     OA    Chronic cholecystitis     Depression     Diabetes mellitus ([facility] [address] 2003    type 2    Hard of hearing     wears bilateral hearing aids    Hx of bilateral cataract extraction     Hyperlipidemia     Hypertension     Multiple thyroid nodules     \"have been watching them for years\"    Sleep apnea     does not use machine    Vitamin D deficiency     Wears glasses     for reading       Prior to Admission medications    Medication Sig Start Date End Date Taking? Authorizing Provider   Calcium Carbonate (CALCIUM 600 PO) Take by mouth   Yes Historical Provider, MD   Cholecalciferol (VITAMIN D3) 1.25 MG (02492 UT) CAPS Take by mouth On hold per Dr. Esthela Linn 12/16/2021   Yes Historical Provider, MD   atorvastatin (LIPITOR) 40 MG tablet Take 40 mg by mouth daily   Yes Historical Provider, MD   Biotin 1 MG CAPS Take 1 capsule by mouth daily   Yes Historical Provider, MD   famotidine (PEPCID) 20 MG tablet  8/19/21  Yes Historical Provider, MD   amLODIPine-benazepril (LOTREL) 5-10 MG per capsule Take 1 capsule by mouth every evening   Yes Historical Provider, MD   metoprolol (LOPRESSOR) 100 MG tablet Take 100 mg by mouth daily   Yes Historical Provider, MD   modafinil (PROVIGIL) 200 MG tablet Take 200 mg by mouth daily.    Yes Historical Provider, MD   escitalopram (LEXAPRO) 20 MG tablet Take 20 mg by mouth daily   Yes Historical Provider, MD   buPROPion HCl (WELLBUTRIN PO) Take 150 mg by mouth daily   Yes Historical Provider, MD   Insulin Detemir (LEVEMIR SC) Inject 24 Units into the skin every morning   Yes Historical Provider, MD   insulin lispro (HUMALOG) 100 UNIT/ML injection vial Inject 4 Units into the skin 3 times daily (before meals)   Yes Historical Provider, MD   Melatonin 5 MG CAPS Take 5 mg by mouth nightly as needed  Patient not taking: Reported on 6/21/2022    Historical Provider, MD       Past Surgical History:   Procedure Laterality Date    CERVICAL SPINE SURGERY       anterior  with hardware C4-C5    CHOLECYSTECTOMY, LAPAROSCOPIC N/A 3/10/2020    CHOLECYSTECTOMY LAPAROSCOPIC ROBOTIC XI performed by Lew Samaniego MD at [address] [place] [address] Rd      x 2    EYE SURGERY Bilateral     monovision    FINGER TRIGGER RELEASE      right index, middle finger, left middle finger    HYSTERECTOMY (CERVIX STATUS UNKNOWN) 1989    with BSO    KNEE ARTHROSCOPY Right     NEPHROSTOMY  1990    and removal    URETER STENT PLACEMENT Right 1989       History reviewed. No pertinent family history. Social History     Tobacco Use    Smoking status: Never Smoker    Smokeless tobacco: Never Used   Substance Use Topics    Alcohol use: Never       Review of Systems    Review of Systems:   History obtained from chart review and the patient  General ROS: negative for - chills, fatigue, fever, night sweats or weight gain  Constitutional: Negative for chills, diaphoresis, fatigue, fever and unexpected weight change. Musculoskeletal: Positive for arthralgias, gait problem and joint swelling. Neurological ROS: negative for - behavioral changes, confusion, headaches or seizures. Negative for weakness and numbness. Dermatological ROS: negative for - mole changes, rash  Cardiovascular: Negative for leg swelling. Gastrointestinal: Negative for constipation, diarrhea, nausea and vomiting. Lower Extremity Physical Examination:   Vitals: There were no vitals filed for this visit. General: AAO x 3 in NAD. Dermatologic Exam:  Full thickness ulcer noted to right 4th webspace without purulence. Minor serous drainage, minimal erythema. No probe to bone    Skin No rashes or nodules noted. .   Skin is thin, with flaky sloughing skin as well as decreased hair growth to the lower leg  Small red hemosiderin deposits seen dorsal foot   Musculoskeletal:     1st MPJ ROM decreased, Bilateral.  Muscle strength 5/5, Bilateral.  Pain present upon palpation of toenails 1-5, Bilateral. decreased medial longitudinal arch, Bilateral.  Ankle ROM decreased,Bilateral.    Dorsally contracted digits present digits 2, Bilateral.     Vascular: DP pulses 1/4 bilateral.  PT pulses 0/4 bilateral.   CFT <5 seconds, Bilateral.  Hair growth absent to the level of the digits, Bilateral.  Edema present, Bilateral.  Varicosities absent, Bilateral. Erythema absent, Bilateral    Neurological: Sensation diminshed to light touch to level of digits, Bilateral.  Protective sensation intact 6/10 sites via 5.07/10g Tom Bean-Lia Monofilament, Bilateral.  negative Tinel's, Bilateral.  negative Valleix sign, Bilateral.      Integument: Warm, dry, supple, Bilateral.    Interdigital maceration absent to web spaces 4, Bilateral.  Nails 1-5 left and 1-5 right thickened > 3.0 mm, dystrophic and crumbly, discolored with yellow subungual debris. Fissures absent, Bilateral.       Asessment: Patient is a 78 y.o. female with:    Diagnosis Orders   1. Type II diabetes mellitus with peripheral circulatory disorder (HCC)  73329 - ME DEBRIDEMENT OF NAILS, 6 OR MORE     DIABETES FOOT EXAM   2. Right foot ulcer, with fat layer exposed ([address]   DIABETES FOOT EXAM   3. Dermatophytosis of nail  21736 - ME DEBRIDEMENT OF NAILS, 6 OR MORE    HM DIABETES FOOT EXAM   4. Pain in both lower extremities  48788 - ME DEBRIDEMENT OF NAILS, 6 OR MORE    HM DIABETES FOOT EXAM         Plan: Patient examined and evaluated. Current condition and treatment options discussed in detail. Discussed conservative and surgical options with the patient. Cleansed wound with betadine. Dispensed toe spacer. Advised pt to wear spacer daily. Applied triple abx and bandaid. Keep clean and dry. Nails 1,2,3,4,5 Right and 1,2,3,4,5 Left were debrided and ground smooth with a dremmel. The patient tolerated the procedure well without apparent complications. All labs were reviewed and all imagining including the above findings were reviewed PRIOR to the patients arrival and with the patient today. Previous patient encounter was reviewed. Encounters from the patients other medical providers were reviewed and noted. Time was spent educating the patient and their families/caregivers on proper care of the feet and ankles. All the above diagnosis were addressed at todays visit and all questions were answered.   A total of 45 minutes was spent with this patients encounter which included charting after the patients visit    . Verbal and written instructions given to patient. Contact office with any questions/problems/concerns.   RTC in 2week(s).    6/21/2022    Electronically signed by Geeta Frias DPM on 6/21/2022 at 10:37 AM  6/21/2022

## 2022-07-05 ENCOUNTER — OFFICE VISIT (OUTPATIENT)
Dept: PODIATRY | Age: 77
End: 2022-07-05
Payer: MEDICARE

## 2022-07-05 VITALS — HEIGHT: 60 IN | BODY MASS INDEX: 36.12 KG/M2 | WEIGHT: 184 LBS

## 2022-07-05 DIAGNOSIS — M79.604 PAIN IN BOTH LOWER EXTREMITIES: ICD-10-CM

## 2022-07-05 DIAGNOSIS — E11.51 TYPE II DIABETES MELLITUS WITH PERIPHERAL CIRCULATORY DISORDER (HCC): Primary | ICD-10-CM

## 2022-07-05 DIAGNOSIS — M79.605 PAIN IN BOTH LOWER EXTREMITIES: ICD-10-CM

## 2022-07-05 DIAGNOSIS — L97.512 RIGHT FOOT ULCER, WITH FAT LAYER EXPOSED (HCC): ICD-10-CM

## 2022-07-05 PROCEDURE — 1123F ACP DISCUSS/DSCN MKR DOCD: CPT | Performed by: PODIATRIST

## 2022-07-05 PROCEDURE — 99213 OFFICE O/P EST LOW 20 MIN: CPT | Performed by: PODIATRIST

## 2022-07-07 NOTE — PROGRESS NOTES
[address] [facility] PODIATRY  [address] [address] [address]  Dept: [phone]    PATIENT PROGRESS NOTE  Date of patient's visit: 7/7/2022  Patient's Name:  Sang Fraser YOB: 1945            Patient Care Team:  Burak Feliciano as PCP - General        Chief Complaint   Patient presents with    Diabetes    Peripheral Neuropathy    Wound Check     right foot x 8 weeks         Diabetes      HPI:   Sang Fraser is a 78 y.o. female who presents to the office today complaining of right foot wound. Treatments prior to today's visit include: offloading pad with triple abx. Currently denies F/C/N/V. Pt's primary care physician is Burak Feliciano last seen June 2 2022. She states she also has a ulcer in between her 4th and 5th toes on the right foot that have improved. She states it has been present for 6 weeks. She also has been applying triple antibiotic cream. She states she only has pain when pressure is applied to the area. No Known Allergies    Past Medical History:   Diagnosis Date    Acid reflux disease     Arthritis     OA    Chronic cholecystitis     Depression     Diabetes mellitus ([address] 2003    type 2    Hard of hearing     wears bilateral hearing aids    Hx of bilateral cataract extraction     Hyperlipidemia     Hypertension     Multiple thyroid nodules     \"have been watching them for years\"    Sleep apnea     does not use machine    Vitamin D deficiency     Wears glasses     for reading       Prior to Admission medications    Medication Sig Start Date End Date Taking?  Authorizing Provider   Calcium Carbonate (CALCIUM 600 PO) Take by mouth   Yes Historical Provider, MD   Cholecalciferol (VITAMIN D3) 1.25 MG (16691 UT) CAPS Take by mouth On hold per Dr. Delisa Peñaloza 12/16/2021   Yes Historical Provider, MD   atorvastatin (LIPITOR) 40 MG tablet Take 40 mg by mouth daily   Yes Historical Provider, MD Biotin 1 MG CAPS Take 1 capsule by mouth daily   Yes Historical Provider, MD   famotidine (PEPCID) 20 MG tablet  8/19/21  Yes Historical Provider, MD   amLODIPine-benazepril (LOTREL) 5-10 MG per capsule Take 1 capsule by mouth every evening   Yes Historical Provider, MD   metoprolol (LOPRESSOR) 100 MG tablet Take 100 mg by mouth daily   Yes Historical Provider, MD   modafinil (PROVIGIL) 200 MG tablet Take 200 mg by mouth daily. Yes Historical Provider, MD   escitalopram (LEXAPRO) 20 MG tablet Take 20 mg by mouth daily   Yes Historical Provider, MD   buPROPion HCl (WELLBUTRIN PO) Take 150 mg by mouth daily   Yes Historical Provider, MD   Insulin Detemir (LEVEMIR SC) Inject 24 Units into the skin every morning   Yes Historical Provider, MD   insulin lispro (HUMALOG) 100 UNIT/ML injection vial Inject 4 Units into the skin 3 times daily (before meals)   Yes Historical Provider, MD   Melatonin 5 MG CAPS Take 5 mg by mouth nightly as needed    Yes Historical Provider, MD       Past Surgical History:   Procedure Laterality Date    CERVICAL SPINE SURGERY       anterior  with hardware C4-C5    CHOLECYSTECTOMY, LAPAROSCOPIC N/A 3/10/2020    CHOLECYSTECTOMY LAPAROSCOPIC ROBOTIC XI performed by Warren Deutsch MD at [address]      x 2    EYE SURGERY Bilateral     monovision    FINGER TRIGGER RELEASE      right index, middle finger, left middle finger    HYSTERECTOMY (CERVIX STATUS UNKNOWN)  1989    with BSO    KNEE ARTHROSCOPY Right     NEPHROSTOMY  1990    and removal    URETER STENT PLACEMENT Right 1989       No family history on file.     Social History     Tobacco Use    Smoking status: Never Smoker    Smokeless tobacco: Never Used   Substance Use Topics    Alcohol use: Never       Review of Systems    Review of Systems:   History obtained from chart review and the patient  General ROS: negative for - chills, fatigue, fever, night sweats or weight gain  Constitutional: Negative for chills, diaphoresis, fatigue, fever and unexpected weight change. Musculoskeletal: Positive for arthralgias, gait problem and joint swelling. Neurological ROS: negative for - behavioral changes, confusion, headaches or seizures. Negative for weakness and numbness. Dermatological ROS: negative for - mole changes, rash  Cardiovascular: Negative for leg swelling. Gastrointestinal: Negative for constipation, diarrhea, nausea and vomiting. Lower Extremity Physical Examination:   Vitals: There were no vitals filed for this visit. General: AAO x 3 in NAD. Dermatologic Exam:  Ulcer to right 4th webspace is healed. Skin No rashes or nodules noted. .   Skin is thin, with flaky sloughing skin as well as decreased hair growth to the lower leg  Small red hemosiderin deposits seen dorsal foot   Musculoskeletal:     1st MPJ ROM decreased, Bilateral.  Muscle strength 5/5, Bilateral.  Pain present upon palpation of toenails 1-5, Bilateral. decreased medial longitudinal arch, Bilateral.  Ankle ROM decreased,Bilateral.    Dorsally contracted digits present digits 2, Bilateral.     Vascular: DP pulses 1/4 bilateral.  PT pulses 0/4 bilateral.   CFT <5 seconds, Bilateral.  Hair growth absent to the level of the digits, Bilateral.  Edema present, Bilateral.  Varicosities absent, Bilateral. Erythema absent, Bilateral    Neurological: Sensation diminshed to light touch to level of digits, Bilateral.  Protective sensation intact 6/10 sites via 5.07/10g McClelland-Lia Monofilament, Bilateral.  negative Tinel's, Bilateral.  negative Valleix sign, Bilateral.      Integument: Warm, dry, supple, Bilateral.    Interdigital maceration absent to web spaces 4, Bilateral.  Nails 1-5 left and 1-5 right thickened > 3.0 mm, dystrophic and crumbly, discolored with yellow subungual debris. Fissures absent, Bilateral.       Asessment: Patient is a 78 y.o. female with:    Diagnosis Orders   1.  Type II diabetes mellitus with peripheral circulatory disorder ([facility]     2. Right foot ulcer, with fat layer exposed ([facility]     3. Pain in both lower extremities           Plan: Patient examined and evaluated. Current condition and treatment options discussed in detail. Discussed conservative and surgical options with the patient. Cleansed wound with betadine. Dispensed toe spacer. Advised pt to wear spacer daily. Applied triple abx and bandaid PRN. Keep clean and dry. All labs were reviewed and all imagining including the above findings were reviewed PRIOR to the patients arrival and with the patient today. Previous patient encounter was reviewed. Encounters from the patients other medical providers were reviewed and noted. Time was spent educating the patient and their families/caregivers on proper care of the feet and ankles. All the above diagnosis were addressed at todays visit and all questions were answered. A total of 20 minutes was spent with this patients encounter which included charting after the patients visit    . Verbal and written instructions given to patient. Contact office with any questions/problems/concerns.   RTC in 2 month(s).    7/5/2022    Electronically signed by Jose Maynard DPM on 7/7/2022 at 3:51 PM  7/5/2022

## 2022-12-15 ENCOUNTER — HOSPITAL ENCOUNTER (OUTPATIENT)
Age: 77
Discharge: HOME | End: 2022-12-15
Payer: MEDICARE

## 2022-12-15 DIAGNOSIS — N18.31 STAGE 3A CHRONIC KIDNEY DISEASE (HCC): ICD-10-CM

## 2022-12-15 DIAGNOSIS — D63.1 ANEMIA IN STAGE 3B CHRONIC KIDNEY DISEASE (HCC): ICD-10-CM

## 2022-12-15 DIAGNOSIS — E67.3 HYPERVITAMINOSIS D: ICD-10-CM

## 2022-12-15 DIAGNOSIS — D63.1 ANEMIA IN STAGE 3A CHRONIC KIDNEY DISEASE (HCC): ICD-10-CM

## 2022-12-15 DIAGNOSIS — N18.32 ANEMIA IN STAGE 3B CHRONIC KIDNEY DISEASE (HCC): ICD-10-CM

## 2022-12-15 DIAGNOSIS — N18.30 BENIGN HYPERTENSION WITH CKD (CHRONIC KIDNEY DISEASE) STAGE III (HCC): ICD-10-CM

## 2022-12-15 DIAGNOSIS — E83.42 HYPOMAGNESEMIA: ICD-10-CM

## 2022-12-15 DIAGNOSIS — N18.32 STAGE 3B CHRONIC KIDNEY DISEASE (HCC): ICD-10-CM

## 2022-12-15 DIAGNOSIS — E13.22 SECONDARY DIABETES MELLITUS WITH STAGE 3 CHRONIC KIDNEY DISEASE (GFR 30-59) (HCC): ICD-10-CM

## 2022-12-15 DIAGNOSIS — N18.31 ANEMIA IN STAGE 3A CHRONIC KIDNEY DISEASE (HCC): ICD-10-CM

## 2022-12-15 DIAGNOSIS — R80.9 MICROALBUMINURIA: ICD-10-CM

## 2022-12-15 DIAGNOSIS — N18.30 SECONDARY DIABETES MELLITUS WITH STAGE 3 CHRONIC KIDNEY DISEASE (GFR 30-59) (HCC): ICD-10-CM

## 2022-12-15 DIAGNOSIS — R79.89 HIGH SERUM VITAMIN D: ICD-10-CM

## 2022-12-15 DIAGNOSIS — I12.9 BENIGN HYPERTENSION WITH CKD (CHRONIC KIDNEY DISEASE) STAGE III (HCC): ICD-10-CM

## 2022-12-15 LAB
ABSOLUTE EOS #: 0.2 K/UL (ref 0–0.4)
ABSOLUTE LYMPH #: 1 K/UL (ref 1–4.8)
ABSOLUTE MONO #: 0.9 K/UL (ref 0.1–1.3)
ANION GAP SERPL CALCULATED.3IONS-SCNC: 10 MMOL/L (ref 9–17)
BACTERIA: ABNORMAL
BASOPHILS # BLD: 1 % (ref 0–2)
BASOPHILS ABSOLUTE: 0.1 K/UL (ref 0–0.2)
BILIRUBIN URINE: NEGATIVE
BUN BLDV-MCNC: 18 MG/DL (ref 8–23)
CALCIUM SERPL-MCNC: 9.4 MG/DL (ref 8.6–10.4)
CASTS UA: ABNORMAL /LPF
CHLORIDE BLD-SCNC: 99 MMOL/L (ref 98–107)
CO2: 26 MMOL/L (ref 20–31)
COLOR: YELLOW
CREAT SERPL-MCNC: 0.93 MG/DL (ref 0.5–0.9)
EOSINOPHILS RELATIVE PERCENT: 2 % (ref 0–4)
EPITHELIAL CELLS UA: ABNORMAL /HPF
GFR SERPL CREATININE-BSD FRML MDRD: >60 ML/MIN/1.73M2
GLUCOSE URINE: NEGATIVE
HCT VFR BLD CALC: 35.1 % (ref 36–46)
HEMOGLOBIN: 11.5 G/DL (ref 12–16)
KETONES, URINE: NEGATIVE
LEUKOCYTE ESTERASE, URINE: ABNORMAL
LYMPHOCYTES # BLD: 13 % (ref 24–44)
MAGNESIUM: 1.9 MG/DL (ref 1.6–2.6)
MCH RBC QN AUTO: 29.3 PG (ref 26–34)
MCHC RBC AUTO-ENTMCNC: 32.9 G/DL (ref 31–37)
MCV RBC AUTO: 89 FL (ref 80–100)
MONOCYTES # BLD: 12 % (ref 1–7)
NITRITE, URINE: NEGATIVE
PDW BLD-RTO: 14.1 % (ref 11.5–14.9)
PH UA: 6 (ref 5–8)
PHOSPHORUS: 3.1 MG/DL (ref 2.6–4.5)
PLATELET # BLD: 411 K/UL (ref 150–450)
PMV BLD AUTO: 7.7 FL (ref 6–12)
POTASSIUM SERPL-SCNC: 4 MMOL/L (ref 3.7–5.3)
PROTEIN UA: NEGATIVE
RBC # BLD: 3.94 M/UL (ref 4–5.2)
RBC UA: ABNORMAL /HPF
SEG NEUTROPHILS: 72 % (ref 36–66)
SEGMENTED NEUTROPHILS ABSOLUTE COUNT: 5.6 K/UL (ref 1.3–9.1)
SODIUM BLD-SCNC: 135 MMOL/L (ref 135–144)
SPECIFIC GRAVITY UA: 1.01 (ref 1–1.03)
TURBIDITY: CLEAR
URINE HGB: NEGATIVE
UROBILINOGEN, URINE: NORMAL
VITAMIN D 25-HYDROXY: 63.9 NG/ML
WBC # BLD: 7.7 K/UL (ref 3.5–11)
WBC UA: ABNORMAL /HPF

## 2022-12-15 PROCEDURE — 82306 VITAMIN D 25 HYDROXY: CPT

## 2022-12-15 PROCEDURE — 82565 ASSAY OF CREATININE: CPT

## 2022-12-15 PROCEDURE — 82310 ASSAY OF CALCIUM: CPT

## 2022-12-15 PROCEDURE — 84100 ASSAY OF PHOSPHORUS: CPT

## 2022-12-15 PROCEDURE — 80051 ELECTROLYTE PANEL: CPT

## 2022-12-15 PROCEDURE — 83735 ASSAY OF MAGNESIUM: CPT

## 2022-12-15 PROCEDURE — 84520 ASSAY OF UREA NITROGEN: CPT

## 2022-12-15 PROCEDURE — 36415 COLL VENOUS BLD VENIPUNCTURE: CPT

## 2022-12-15 PROCEDURE — 85025 COMPLETE CBC W/AUTO DIFF WBC: CPT

## 2022-12-15 PROCEDURE — 81001 URINALYSIS AUTO W/SCOPE: CPT

## 2022-12-15 PROCEDURE — 82570 ASSAY OF URINE CREATININE: CPT

## 2022-12-15 PROCEDURE — 82043 UR ALBUMIN QUANTITATIVE: CPT

## 2022-12-16 LAB
CREATININE URINE: 61.4 MG/DL (ref 28–217)
MICROALBUMIN/CREAT 24H UR: 25 MG/L
MICROALBUMIN/CREAT UR-RTO: 41 MCG/MG CREAT

## 2022-12-22 PROBLEM — E03.9 HYPOTHYROIDISM: Status: ACTIVE | Noted: 2018-04-16

## 2022-12-22 PROBLEM — E11.65 HYPERGLYCEMIA DUE TO TYPE 2 DIABETES MELLITUS (HCC): Status: ACTIVE | Noted: 2021-12-20

## 2022-12-22 PROBLEM — G47.30 SLEEP APNEA: Status: ACTIVE | Noted: 2018-04-16

## 2022-12-22 PROBLEM — H90.3 SENSORINEURAL HEARING LOSS, BILATERAL: Status: ACTIVE | Noted: 2022-12-22

## 2022-12-22 PROBLEM — G43.009 MIGRAINE WITHOUT AURA, NOT REFRACTORY: Status: ACTIVE | Noted: 2021-12-20

## 2022-12-22 PROBLEM — E11.9 TYPE 2 DIABETES MELLITUS WITHOUT COMPLICATION (HCC): Status: ACTIVE | Noted: 2018-04-16

## 2022-12-22 PROBLEM — G44.209 TENSION-TYPE HEADACHE: Status: ACTIVE | Noted: 2022-12-22

## 2022-12-22 PROBLEM — F33.8 SEASONAL AFFECTIVE DISORDER (HCC): Status: ACTIVE | Noted: 2018-11-14

## 2022-12-22 PROBLEM — F32.9 MAJOR DEPRESSION, SINGLE EPISODE: Status: ACTIVE | Noted: 2018-04-16

## 2022-12-22 PROBLEM — G62.9 NEUROPATHY: Status: ACTIVE | Noted: 2021-12-20

## 2023-05-09 ENCOUNTER — HOSPITAL ENCOUNTER (OUTPATIENT)
Age: 78
Setting detail: SPECIMEN
Discharge: HOME | End: 2023-05-09

## 2023-05-09 LAB
CREATININE URINE: 81.4 MG/DL (ref 28–217)
MICROALBUMIN/CREAT 24H UR: 15 MG/L
MICROALBUMIN/CREAT UR-RTO: 18 MCG/MG CREAT

## 2024-04-10 ENCOUNTER — HOSPITAL ENCOUNTER (OUTPATIENT)
Age: 79
Setting detail: SPECIMEN
Discharge: HOME | End: 2024-04-10
Payer: MEDICARE

## 2024-04-10 ENCOUNTER — HOSPITAL ENCOUNTER (OUTPATIENT)
Dept: PREADMISSION TESTING | Age: 79
Discharge: HOME | End: 2024-04-10
Payer: MEDICARE

## 2024-04-10 VITALS
OXYGEN SATURATION: 96 % | BODY MASS INDEX: 35.83 KG/M2 | DIASTOLIC BLOOD PRESSURE: 78 MMHG | TEMPERATURE: 97.8 F | HEIGHT: 61 IN | SYSTOLIC BLOOD PRESSURE: 148 MMHG | WEIGHT: 189.8 LBS | RESPIRATION RATE: 16 BRPM | HEART RATE: 82 BPM

## 2024-04-10 LAB
ALBUMIN SERPL-MCNC: 4.2 G/DL (ref 3.5–5.2)
ALP SERPL-CCNC: 108 U/L (ref 35–104)
ALT SERPL-CCNC: 15 U/L (ref 10–35)
ANION GAP SERPL CALCULATED.3IONS-SCNC: 12 MMOL/L (ref 9–17)
AST SERPL-CCNC: 24 U/L (ref 10–35)
BASOPHILS # BLD: 0.06 K/UL (ref 0–0.2)
BASOPHILS # BLD: 0.06 K/UL (ref 0–0.2)
BASOPHILS NFR BLD: 1 % (ref 0–2)
BASOPHILS NFR BLD: 1 % (ref 0–2)
BILIRUB UR QL STRIP: NEGATIVE
BUN SERPL-MCNC: 14 MG/DL (ref 8–23)
BUN/CREAT SERPL: 14 (ref 9–20)
CALCIUM SERPL-MCNC: 9.7 MG/DL (ref 8.6–10.4)
CHLORIDE SERPL-SCNC: 100 MMOL/L (ref 98–107)
CLARITY UR: CLEAR
CO2 SERPL-SCNC: 26 MMOL/L (ref 20–31)
COLOR UR: YELLOW
COMMENT: ABNORMAL
CREAT SERPL-MCNC: 1 MG/DL (ref 0.5–0.9)
CREAT SERPL-MCNC: 1.1 MG/DL (ref 0.5–0.9)
CRP SERPL HS-MCNC: <3 MG/L (ref 0–5)
EOSINOPHIL # BLD: 0.16 K/UL (ref 0–0.44)
EOSINOPHIL # BLD: 0.18 K/UL (ref 0–0.44)
EOSINOPHILS RELATIVE PERCENT: 2 % (ref 1–4)
EOSINOPHILS RELATIVE PERCENT: 2 % (ref 1–4)
ERYTHROCYTE [DISTWIDTH] IN BLOOD BY AUTOMATED COUNT: 12.8 % (ref 11.8–14.4)
ERYTHROCYTE [DISTWIDTH] IN BLOOD BY AUTOMATED COUNT: 12.9 % (ref 11.8–14.4)
ERYTHROCYTE [SEDIMENTATION RATE] IN BLOOD BY PHOTOMETRIC METHOD: 18 MM/HR (ref 0–30)
EST. AVERAGE GLUCOSE BLD GHB EST-MCNC: 226 MG/DL
GFR SERPL CREATININE-BSD FRML MDRD: 51 ML/MIN/1.73M2
GFR SERPL CREATININE-BSD FRML MDRD: 58 ML/MIN/1.73M2
GLUCOSE SERPL-MCNC: 266 MG/DL (ref 70–99)
GLUCOSE UR STRIP-MCNC: ABNORMAL MG/DL
HBA1C MFR BLD: 9.5 % (ref 4–6)
HCT VFR BLD AUTO: 37.7 % (ref 36.3–47.1)
HCT VFR BLD AUTO: 37.9 % (ref 36.3–47.1)
HGB BLD-MCNC: 12 G/DL (ref 11.9–15.1)
HGB BLD-MCNC: 12 G/DL (ref 11.9–15.1)
HGB UR QL STRIP.AUTO: NEGATIVE
IMM GRANULOCYTES # BLD AUTO: 0.05 K/UL (ref 0–0.3)
IMM GRANULOCYTES # BLD AUTO: 0.06 K/UL (ref 0–0.3)
IMM GRANULOCYTES NFR BLD: 1 %
IMM GRANULOCYTES NFR BLD: 1 %
INR PPP: 1
KETONES UR STRIP-MCNC: NEGATIVE MG/DL
LEUKOCYTE ESTERASE UR QL STRIP: NEGATIVE
LYMPHOCYTES NFR BLD: 1.63 K/UL (ref 1.1–3.7)
LYMPHOCYTES NFR BLD: 1.74 K/UL (ref 1.1–3.7)
LYMPHOCYTES RELATIVE PERCENT: 19 % (ref 24–43)
LYMPHOCYTES RELATIVE PERCENT: 19 % (ref 24–43)
MCH RBC QN AUTO: 30.5 PG (ref 25.2–33.5)
MCH RBC QN AUTO: 30.8 PG (ref 25.2–33.5)
MCHC RBC AUTO-ENTMCNC: 31.7 G/DL (ref 28.4–34.8)
MCHC RBC AUTO-ENTMCNC: 31.8 G/DL (ref 28.4–34.8)
MCV RBC AUTO: 96.4 FL (ref 82.6–102.9)
MCV RBC AUTO: 96.9 FL (ref 82.6–102.9)
MONOCYTES NFR BLD: 0.88 K/UL (ref 0.1–1.2)
MONOCYTES NFR BLD: 0.92 K/UL (ref 0.1–1.2)
MONOCYTES NFR BLD: 10 % (ref 3–12)
MONOCYTES NFR BLD: 10 % (ref 3–12)
NEUTROPHILS NFR BLD: 67 % (ref 36–65)
NEUTROPHILS NFR BLD: 67 % (ref 36–65)
NEUTS SEG NFR BLD: 5.91 K/UL (ref 1.5–8.1)
NEUTS SEG NFR BLD: 6.28 K/UL (ref 1.5–8.1)
NITRITE UR QL STRIP: NEGATIVE
NRBC BLD-RTO: 0 PER 100 WBC
NRBC BLD-RTO: 0 PER 100 WBC
PARTIAL THROMBOPLASTIN TIME: 23 SEC (ref 23.9–33.8)
PH UR STRIP: 6 [PH] (ref 5–8)
PLATELET # BLD AUTO: 374 K/UL (ref 138–453)
PLATELET # BLD AUTO: 380 K/UL (ref 138–453)
PMV BLD AUTO: 10.2 FL (ref 8.1–13.5)
PMV BLD AUTO: 9.9 FL (ref 8.1–13.5)
POTASSIUM SERPL-SCNC: 3.3 MMOL/L (ref 3.7–5.3)
PROT UR STRIP-MCNC: NEGATIVE MG/DL
PROTHROMBIN TIME: 13.3 SEC (ref 11.5–14.2)
RBC # BLD AUTO: 3.89 M/UL (ref 3.95–5.11)
RBC # BLD AUTO: 3.93 M/UL (ref 3.95–5.11)
SODIUM SERPL-SCNC: 138 MMOL/L (ref 135–144)
SP GR UR STRIP: 1.01 (ref 1–1.03)
UROBILINOGEN UR STRIP-ACNC: NORMAL EU/DL (ref 0–1)
WBC OTHER # BLD: 8.7 K/UL (ref 3.5–11.3)
WBC OTHER # BLD: 9.2 K/UL (ref 3.5–11.3)

## 2024-04-10 PROCEDURE — 82040 ASSAY OF SERUM ALBUMIN: CPT

## 2024-04-10 PROCEDURE — 93005 ELECTROCARDIOGRAM TRACING: CPT | Performed by: ORTHOPAEDIC SURGERY

## 2024-04-10 PROCEDURE — 36415 COLL VENOUS BLD VENIPUNCTURE: CPT

## 2024-04-10 PROCEDURE — 80048 BASIC METABOLIC PNL TOTAL CA: CPT

## 2024-04-10 PROCEDURE — 83036 HEMOGLOBIN GLYCOSYLATED A1C: CPT

## 2024-04-10 PROCEDURE — 84450 TRANSFERASE (AST) (SGOT): CPT

## 2024-04-10 PROCEDURE — 85652 RBC SED RATE AUTOMATED: CPT

## 2024-04-10 PROCEDURE — 85025 COMPLETE CBC W/AUTO DIFF WBC: CPT

## 2024-04-10 PROCEDURE — 84460 ALANINE AMINO (ALT) (SGPT): CPT

## 2024-04-10 PROCEDURE — 86140 C-REACTIVE PROTEIN: CPT

## 2024-04-10 PROCEDURE — 85730 THROMBOPLASTIN TIME PARTIAL: CPT

## 2024-04-10 PROCEDURE — 85610 PROTHROMBIN TIME: CPT

## 2024-04-10 PROCEDURE — 82565 ASSAY OF CREATININE: CPT

## 2024-04-10 PROCEDURE — 81003 URINALYSIS AUTO W/O SCOPE: CPT

## 2024-04-10 PROCEDURE — 84075 ASSAY ALKALINE PHOSPHATASE: CPT

## 2024-04-10 PROCEDURE — 87086 URINE CULTURE/COLONY COUNT: CPT

## 2024-04-10 RX ORDER — BUSPIRONE HYDROCHLORIDE 5 MG/1
5 TABLET ORAL EVERY MORNING
Status: ON HOLD | COMMUNITY

## 2024-04-10 NOTE — PRE-PROCEDURE INSTRUCTIONS
On the Day of Your Surgery, Monday, 4/15/2024, Please Arrive At 1000 AM     Enter the hospital through the Main Entrance, take the lobby elevators to the second floor and check in at the Surgery Registration desk.     Continue to take your home medications as you normally do up to and including the night before surgery with the exception of blood thinning medications.    Blood Thinning Medications:  Please stop prescription blood thinning medications such as Apixaban (Eliquis); Clopidogrel (Plavix); Dabigatran (Pradaxa); Prasugrel (Effient); Rivaroxaban (Xarelto); Ticagrelor (Brilinta); Warfarin (Coumadin) only as directed by your surgeon and/or the prescribing physician    Some common examples of other medications that can thin your blood are: Aspirin, Ibuprofen (Advil, Motrin), Naproxen (Aleve), Meloxicam (Mobic), Celecoxib (Celebrex), Fish Oil, many Herbal Supplements.  These medications should usually be stopped at least 7 days prior to surgery.    none    Tylenol is OK to take for pain the week prior to surgery.    Failure to stop certain medications may interfere with your scheduled surgery.    If you receive instructions from your surgeon regarding what medications to stop prior to surgery, please follow those specific instructions.    If You Have Diabetes:  Do not take any of your diabetic medications, (injectables or by mouth) the morning of surgery unless otherwise instructed by the doctor who manages your diabetes. If you are taking insulin, contact the doctor the manages your diabetes for instructions about any changes to your insulin dosages the day before surgery.      If your blood sugar is low enough the morning of surgery that it is causing problems for you please call the PreOp department at [phone] for instructions.    Please take the following medication(s) the day of surgery with small sips of water:              Famotidine, buspirone,bupropion, escitalopram, metoprolol    Please use your  have someone to ride home in the vehicle with you.   For your safety, someone must remain with you for the first 24 hours after your surgery if you receive anesthesia or medication.  If you do not have someone to stay with you, your procedure may be cancelled.  As a patient at [facility] you can expect quality medical and nursing care that is centered on you individual needs.  Our goal is to make your surgical experience as comfortable as possible.    Any questions about preparing for your surgery please call [phone].      ____________________________   ____________________________  Signature (Patient)                                 Signature (Nurse)                     Date

## 2024-04-11 LAB
EKG ATRIAL RATE: 79 BPM
EKG P AXIS: 50 DEGREES
EKG P-R INTERVAL: 176 MS
EKG Q-T INTERVAL: 414 MS
EKG QRS DURATION: 90 MS
EKG QTC CALCULATION (BAZETT): 474 MS
EKG R AXIS: -35 DEGREES
EKG T AXIS: 42 DEGREES
EKG VENTRICULAR RATE: 79 BPM
MICROORGANISM SPEC CULT: NO GROWTH
SPECIMEN DESCRIPTION: NORMAL

## 2024-04-11 PROCEDURE — 93010 ELECTROCARDIOGRAM REPORT: CPT | Performed by: INTERNAL MEDICINE

## 2024-04-11 NOTE — PERIOP NOTE
Left a voice message with Lanette regarding the need for cardiac clearance and asked that she call back to confirm this message.

## 2024-04-11 NOTE — PERIOP NOTE
Dr. Crandall reviewed labs,EKG,stress test and history and is requesting cardiac clearance due to history,functional capacity and stress test

## 2024-04-12 ENCOUNTER — ANESTHESIA EVENT (OUTPATIENT)
Dept: OPERATING ROOM | Age: 79
End: 2024-04-12
Payer: MEDICARE

## 2024-04-12 NOTE — PERIOP NOTE
Spoke with Dr. Ospina and patient does not fall under the criteria for cardiac clearance so Medical clearance is fine for surgery on Monday 4/15/2024. Lanette at Dr. Vasquez's office is aware of this and will call the patient's family.

## 2024-04-15 ENCOUNTER — ANESTHESIA (OUTPATIENT)
Dept: OPERATING ROOM | Age: 79
End: 2024-04-15
Payer: MEDICARE

## 2024-04-15 ENCOUNTER — APPOINTMENT (OUTPATIENT)
Dept: GENERAL RADIOLOGY | Age: 79
End: 2024-04-15
Attending: ORTHOPAEDIC SURGERY
Payer: MEDICARE

## 2024-04-15 ENCOUNTER — HOSPITAL ENCOUNTER (OUTPATIENT)
Age: 79
Discharge: HOME | End: 2024-04-16
Attending: ORTHOPAEDIC SURGERY | Admitting: ORTHOPAEDIC SURGERY
Payer: MEDICARE

## 2024-04-15 DIAGNOSIS — M48.062 LUMBAR STENOSIS WITH NEUROGENIC CLAUDICATION: Primary | ICD-10-CM

## 2024-04-15 LAB
GLUCOSE BLD-MCNC: 151 MG/DL (ref 65–105)
GLUCOSE BLD-MCNC: 170 MG/DL (ref 65–105)
GLUCOSE BLD-MCNC: 176 MG/DL (ref 65–105)

## 2024-04-15 PROCEDURE — 2580000003 HC RX 258: Performed by: ANESTHESIOLOGY

## 2024-04-15 PROCEDURE — 6360000002 HC RX W HCPCS: Performed by: NURSE ANESTHETIST, CERTIFIED REGISTERED

## 2024-04-15 PROCEDURE — A4216 STERILE WATER/SALINE, 10 ML: HCPCS | Performed by: ORTHOPAEDIC SURGERY

## 2024-04-15 PROCEDURE — 3600000002 HC SURGERY LEVEL 2 BASE: Performed by: ORTHOPAEDIC SURGERY

## 2024-04-15 PROCEDURE — 3700000001 HC ADD 15 MINUTES (ANESTHESIA): Performed by: ORTHOPAEDIC SURGERY

## 2024-04-15 PROCEDURE — 2500000003 HC RX 250 WO HCPCS: Performed by: SPECIALIST

## 2024-04-15 PROCEDURE — 3700000000 HC ANESTHESIA ATTENDED CARE: Performed by: ORTHOPAEDIC SURGERY

## 2024-04-15 PROCEDURE — 6370000000 HC RX 637 (ALT 250 FOR IP): Performed by: ORTHOPAEDIC SURGERY

## 2024-04-15 PROCEDURE — 2700000000 HC OXYGEN THERAPY PER DAY

## 2024-04-15 PROCEDURE — 6360000002 HC RX W HCPCS: Performed by: SPECIALIST

## 2024-04-15 PROCEDURE — A4217 STERILE WATER/SALINE, 500 ML: HCPCS | Performed by: ORTHOPAEDIC SURGERY

## 2024-04-15 PROCEDURE — 2580000003 HC RX 258: Performed by: SPECIALIST

## 2024-04-15 PROCEDURE — 7100000001 HC PACU RECOVERY - ADDTL 15 MIN: Performed by: ORTHOPAEDIC SURGERY

## 2024-04-15 PROCEDURE — 2580000003 HC RX 258: Performed by: ORTHOPAEDIC SURGERY

## 2024-04-15 PROCEDURE — 3600000012 HC SURGERY LEVEL 2 ADDTL 15MIN: Performed by: ORTHOPAEDIC SURGERY

## 2024-04-15 PROCEDURE — 6370000000 HC RX 637 (ALT 250 FOR IP): Performed by: NURSE PRACTITIONER

## 2024-04-15 PROCEDURE — 2709999900 HC NON-CHARGEABLE SUPPLY: Performed by: ORTHOPAEDIC SURGERY

## 2024-04-15 PROCEDURE — 82947 ASSAY GLUCOSE BLOOD QUANT: CPT

## 2024-04-15 PROCEDURE — 2500000003 HC RX 250 WO HCPCS: Performed by: ORTHOPAEDIC SURGERY

## 2024-04-15 PROCEDURE — 99223 1ST HOSP IP/OBS HIGH 75: CPT | Performed by: NURSE PRACTITIONER

## 2024-04-15 PROCEDURE — 7100000000 HC PACU RECOVERY - FIRST 15 MIN: Performed by: ORTHOPAEDIC SURGERY

## 2024-04-15 PROCEDURE — 2720000010 HC SURG SUPPLY STERILE: Performed by: ORTHOPAEDIC SURGERY

## 2024-04-15 PROCEDURE — 94761 N-INVAS EAR/PLS OXIMETRY MLT: CPT

## 2024-04-15 PROCEDURE — 2500000003 HC RX 250 WO HCPCS: Performed by: NURSE ANESTHETIST, CERTIFIED REGISTERED

## 2024-04-15 RX ORDER — DEXTROSE MONOHYDRATE 100 MG/ML
INJECTION, SOLUTION INTRAVENOUS CONTINUOUS PRN
Status: DISCONTINUED | OUTPATIENT
Start: 2024-04-15 | End: 2024-04-16 | Stop reason: HOSPADM

## 2024-04-15 RX ORDER — PROMETHAZINE HYDROCHLORIDE 12.5 MG/1
12.5 TABLET ORAL EVERY 6 HOURS PRN
Status: DISCONTINUED | OUTPATIENT
Start: 2024-04-15 | End: 2024-04-16 | Stop reason: HOSPADM

## 2024-04-15 RX ORDER — SODIUM CHLORIDE 0.9 % (FLUSH) 0.9 %
5-40 SYRINGE (ML) INJECTION PRN
Status: DISCONTINUED | OUTPATIENT
Start: 2024-04-15 | End: 2024-04-15 | Stop reason: HOSPADM

## 2024-04-15 RX ORDER — MORPHINE SULFATE 4 MG/ML
4 INJECTION, SOLUTION INTRAMUSCULAR; INTRAVENOUS
Status: DISCONTINUED | OUTPATIENT
Start: 2024-04-15 | End: 2024-04-16 | Stop reason: HOSPADM

## 2024-04-15 RX ORDER — GLYCOPYRROLATE 0.2 MG/ML
INJECTION INTRAMUSCULAR; INTRAVENOUS PRN
Status: DISCONTINUED | OUTPATIENT
Start: 2024-04-15 | End: 2024-04-15 | Stop reason: SDUPTHER

## 2024-04-15 RX ORDER — NALOXONE HYDROCHLORIDE 0.4 MG/ML
INJECTION, SOLUTION INTRAMUSCULAR; INTRAVENOUS; SUBCUTANEOUS PRN
Status: DISCONTINUED | OUTPATIENT
Start: 2024-04-15 | End: 2024-04-15 | Stop reason: HOSPADM

## 2024-04-15 RX ORDER — FUROSEMIDE 20 MG/1
20 TABLET ORAL DAILY
Status: DISCONTINUED | OUTPATIENT
Start: 2024-04-15 | End: 2024-04-16 | Stop reason: HOSPADM

## 2024-04-15 RX ORDER — ONDANSETRON 2 MG/ML
4 INJECTION INTRAMUSCULAR; INTRAVENOUS
Status: DISCONTINUED | OUTPATIENT
Start: 2024-04-15 | End: 2024-04-15 | Stop reason: HOSPADM

## 2024-04-15 RX ORDER — EPHEDRINE SULFATE/0.9% NACL/PF 25 MG/5 ML
SYRINGE (ML) INTRAVENOUS PRN
Status: DISCONTINUED | OUTPATIENT
Start: 2024-04-15 | End: 2024-04-15 | Stop reason: SDUPTHER

## 2024-04-15 RX ORDER — SODIUM CHLORIDE 0.9 % (FLUSH) 0.9 %
5-40 SYRINGE (ML) INJECTION EVERY 12 HOURS SCHEDULED
Status: DISCONTINUED | OUTPATIENT
Start: 2024-04-15 | End: 2024-04-16 | Stop reason: HOSPADM

## 2024-04-15 RX ORDER — METHOCARBAMOL 750 MG/1
750 TABLET, FILM COATED ORAL EVERY 8 HOURS PRN
Status: DISCONTINUED | OUTPATIENT
Start: 2024-04-15 | End: 2024-04-16 | Stop reason: HOSPADM

## 2024-04-15 RX ORDER — INSULIN GLARGINE 100 [IU]/ML
40 INJECTION, SOLUTION SUBCUTANEOUS NIGHTLY
COMMUNITY
Start: 2024-04-11 | End: 2024-04-20

## 2024-04-15 RX ORDER — SODIUM CHLORIDE 9 MG/ML
INJECTION, SOLUTION INTRAVENOUS PRN
Status: DISCONTINUED | OUTPATIENT
Start: 2024-04-15 | End: 2024-04-15 | Stop reason: HOSPADM

## 2024-04-15 RX ORDER — ONDANSETRON 2 MG/ML
4 INJECTION INTRAMUSCULAR; INTRAVENOUS EVERY 6 HOURS PRN
Status: DISCONTINUED | OUTPATIENT
Start: 2024-04-15 | End: 2024-04-16 | Stop reason: HOSPADM

## 2024-04-15 RX ORDER — INSULIN DETEMIR 100 [IU]/ML
38 INJECTION, SOLUTION SUBCUTANEOUS
Status: ON HOLD | COMMUNITY
Start: 2024-03-18

## 2024-04-15 RX ORDER — HYDROCODONE BITARTRATE AND ACETAMINOPHEN 5; 325 MG/1; MG/1
2 TABLET ORAL EVERY 4 HOURS PRN
Status: DISCONTINUED | OUTPATIENT
Start: 2024-04-15 | End: 2024-04-16 | Stop reason: HOSPADM

## 2024-04-15 RX ORDER — FUROSEMIDE 20 MG/1
20 TABLET ORAL DAILY
Status: ON HOLD | COMMUNITY

## 2024-04-15 RX ORDER — BUPROPION HYDROCHLORIDE 300 MG/1
300 TABLET ORAL EVERY MORNING
Status: ON HOLD | COMMUNITY
Start: 2024-04-11

## 2024-04-15 RX ORDER — FENTANYL CITRATE 50 UG/ML
INJECTION, SOLUTION INTRAMUSCULAR; INTRAVENOUS PRN
Status: DISCONTINUED | OUTPATIENT
Start: 2024-04-15 | End: 2024-04-15 | Stop reason: SDUPTHER

## 2024-04-15 RX ORDER — LIDOCAINE HYDROCHLORIDE 20 MG/ML
INJECTION, SOLUTION EPIDURAL; INFILTRATION; INTRACAUDAL; PERINEURAL PRN
Status: DISCONTINUED | OUTPATIENT
Start: 2024-04-15 | End: 2024-04-15 | Stop reason: SDUPTHER

## 2024-04-15 RX ORDER — PHENYLEPHRINE HCL IN 0.9% NACL 1 MG/10 ML
SYRINGE (ML) INTRAVENOUS PRN
Status: DISCONTINUED | OUTPATIENT
Start: 2024-04-15 | End: 2024-04-15 | Stop reason: SDUPTHER

## 2024-04-15 RX ORDER — BUPIVACAINE HYDROCHLORIDE AND EPINEPHRINE 5; 5 MG/ML; UG/ML
INJECTION, SOLUTION EPIDURAL; INTRACAUDAL; PERINEURAL PRN
Status: DISCONTINUED | OUTPATIENT
Start: 2024-04-15 | End: 2024-04-15 | Stop reason: ALTCHOICE

## 2024-04-15 RX ORDER — SODIUM CHLORIDE, SODIUM LACTATE, POTASSIUM CHLORIDE, CALCIUM CHLORIDE 600; 310; 30; 20 MG/100ML; MG/100ML; MG/100ML; MG/100ML
INJECTION, SOLUTION INTRAVENOUS CONTINUOUS
Status: DISCONTINUED | OUTPATIENT
Start: 2024-04-15 | End: 2024-04-15 | Stop reason: HOSPADM

## 2024-04-15 RX ORDER — INSULIN LISPRO 100 [IU]/ML
0-4 INJECTION, SOLUTION INTRAVENOUS; SUBCUTANEOUS NIGHTLY
Status: DISCONTINUED | OUTPATIENT
Start: 2024-04-15 | End: 2024-04-16 | Stop reason: HOSPADM

## 2024-04-15 RX ORDER — DAPAGLIFLOZIN 10 MG/1
10 TABLET, FILM COATED ORAL DAILY
Status: ON HOLD | COMMUNITY
Start: 2024-04-15

## 2024-04-15 RX ORDER — INSULIN LISPRO 100 [IU]/ML
0-8 INJECTION, SOLUTION INTRAVENOUS; SUBCUTANEOUS
Status: DISCONTINUED | OUTPATIENT
Start: 2024-04-15 | End: 2024-04-16 | Stop reason: HOSPADM

## 2024-04-15 RX ORDER — CEFAZOLIN SODIUM 1 G/3ML
INJECTION, POWDER, FOR SOLUTION INTRAMUSCULAR; INTRAVENOUS PRN
Status: DISCONTINUED | OUTPATIENT
Start: 2024-04-15 | End: 2024-04-15 | Stop reason: SDUPTHER

## 2024-04-15 RX ORDER — SODIUM CHLORIDE 9 MG/ML
INJECTION, SOLUTION INTRAVENOUS PRN
Status: DISCONTINUED | OUTPATIENT
Start: 2024-04-15 | End: 2024-04-16 | Stop reason: HOSPADM

## 2024-04-15 RX ORDER — ROCURONIUM BROMIDE 10 MG/ML
INJECTION, SOLUTION INTRAVENOUS PRN
Status: DISCONTINUED | OUTPATIENT
Start: 2024-04-15 | End: 2024-04-15 | Stop reason: SDUPTHER

## 2024-04-15 RX ORDER — MORPHINE SULFATE 2 MG/ML
2 INJECTION, SOLUTION INTRAMUSCULAR; INTRAVENOUS
Status: DISCONTINUED | OUTPATIENT
Start: 2024-04-15 | End: 2024-04-16 | Stop reason: HOSPADM

## 2024-04-15 RX ORDER — DAPAGLIFLOZIN 10 MG/1
10 TABLET, FILM COATED ORAL DAILY
Status: DISCONTINUED | OUTPATIENT
Start: 2024-04-15 | End: 2024-04-16 | Stop reason: HOSPADM

## 2024-04-15 RX ORDER — HYDROXYZINE HYDROCHLORIDE 10 MG/1
10 TABLET, FILM COATED ORAL EVERY 8 HOURS PRN
Status: DISCONTINUED | OUTPATIENT
Start: 2024-04-15 | End: 2024-04-16 | Stop reason: HOSPADM

## 2024-04-15 RX ORDER — LIDOCAINE HYDROCHLORIDE 10 MG/ML
1 INJECTION, SOLUTION EPIDURAL; INFILTRATION; INTRACAUDAL; PERINEURAL
Status: DISCONTINUED | OUTPATIENT
Start: 2024-04-16 | End: 2024-04-15 | Stop reason: HOSPADM

## 2024-04-15 RX ORDER — SENNA AND DOCUSATE SODIUM 50; 8.6 MG/1; MG/1
1 TABLET, FILM COATED ORAL 2 TIMES DAILY
Status: DISCONTINUED | OUTPATIENT
Start: 2024-04-15 | End: 2024-04-16 | Stop reason: HOSPADM

## 2024-04-15 RX ORDER — SODIUM CHLORIDE, SODIUM LACTATE, POTASSIUM CHLORIDE, CALCIUM CHLORIDE 600; 310; 30; 20 MG/100ML; MG/100ML; MG/100ML; MG/100ML
INJECTION, SOLUTION INTRAVENOUS CONTINUOUS PRN
Status: DISCONTINUED | OUTPATIENT
Start: 2024-04-15 | End: 2024-04-15 | Stop reason: SDUPTHER

## 2024-04-15 RX ORDER — ESCITALOPRAM OXALATE 10 MG/1
20 TABLET ORAL DAILY
Status: DISCONTINUED | OUTPATIENT
Start: 2024-04-15 | End: 2024-04-16 | Stop reason: HOSPADM

## 2024-04-15 RX ORDER — SODIUM CHLORIDE 0.9 % (FLUSH) 0.9 %
5-40 SYRINGE (ML) INJECTION EVERY 12 HOURS SCHEDULED
Status: DISCONTINUED | OUTPATIENT
Start: 2024-04-15 | End: 2024-04-15 | Stop reason: HOSPADM

## 2024-04-15 RX ORDER — AMLODIPINE BESYLATE AND BENAZEPRIL HYDROCHLORIDE 5; 10 MG/1; MG/1
1 CAPSULE ORAL EVERY MORNING
Status: DISCONTINUED | OUTPATIENT
Start: 2024-04-16 | End: 2024-04-16 | Stop reason: HOSPADM

## 2024-04-15 RX ORDER — POLYETHYLENE GLYCOL 3350 17 G/17G
17 POWDER, FOR SOLUTION ORAL DAILY
Status: DISCONTINUED | OUTPATIENT
Start: 2024-04-15 | End: 2024-04-16 | Stop reason: HOSPADM

## 2024-04-15 RX ORDER — SODIUM CHLORIDE 0.9 % (FLUSH) 0.9 %
5-40 SYRINGE (ML) INJECTION PRN
Status: DISCONTINUED | OUTPATIENT
Start: 2024-04-15 | End: 2024-04-16 | Stop reason: HOSPADM

## 2024-04-15 RX ORDER — FAMOTIDINE 20 MG/1
20 TABLET, FILM COATED ORAL DAILY
Status: DISCONTINUED | OUTPATIENT
Start: 2024-04-15 | End: 2024-04-16 | Stop reason: HOSPADM

## 2024-04-15 RX ORDER — ONDANSETRON 2 MG/ML
INJECTION INTRAMUSCULAR; INTRAVENOUS PRN
Status: DISCONTINUED | OUTPATIENT
Start: 2024-04-15 | End: 2024-04-15 | Stop reason: SDUPTHER

## 2024-04-15 RX ORDER — BUPROPION HYDROCHLORIDE 150 MG/1
150 TABLET ORAL EVERY MORNING
Status: DISCONTINUED | OUTPATIENT
Start: 2024-04-16 | End: 2024-04-16 | Stop reason: HOSPADM

## 2024-04-15 RX ORDER — HYDROCODONE BITARTRATE AND ACETAMINOPHEN 5; 325 MG/1; MG/1
1 TABLET ORAL EVERY 4 HOURS PRN
Status: DISCONTINUED | OUTPATIENT
Start: 2024-04-15 | End: 2024-04-16 | Stop reason: HOSPADM

## 2024-04-15 RX ORDER — SODIUM CHLORIDE 9 MG/ML
INJECTION, SOLUTION INTRAVENOUS CONTINUOUS
Status: DISCONTINUED | OUTPATIENT
Start: 2024-04-15 | End: 2024-04-16 | Stop reason: HOSPADM

## 2024-04-15 RX ORDER — BUSPIRONE HYDROCHLORIDE 5 MG/1
5 TABLET ORAL EVERY MORNING
Status: DISCONTINUED | OUTPATIENT
Start: 2024-04-16 | End: 2024-04-16 | Stop reason: HOSPADM

## 2024-04-15 RX ORDER — OXYCODONE HCL 10 MG/1
10 TABLET, FILM COATED, EXTENDED RELEASE ORAL ONCE
Status: COMPLETED | OUTPATIENT
Start: 2024-04-15 | End: 2024-04-15

## 2024-04-15 RX ORDER — ATORVASTATIN CALCIUM 40 MG/1
40 TABLET, FILM COATED ORAL DAILY
Status: DISCONTINUED | OUTPATIENT
Start: 2024-04-15 | End: 2024-04-16 | Stop reason: HOSPADM

## 2024-04-15 RX ORDER — SODIUM CHLORIDE 9 MG/ML
INJECTION, SOLUTION INTRAVENOUS CONTINUOUS
Status: DISCONTINUED | OUTPATIENT
Start: 2024-04-15 | End: 2024-04-15

## 2024-04-15 RX ORDER — METOPROLOL TARTRATE 100 MG/1
100 TABLET ORAL DAILY
Status: DISCONTINUED | OUTPATIENT
Start: 2024-04-15 | End: 2024-04-16 | Stop reason: HOSPADM

## 2024-04-15 RX ORDER — HYDROMORPHONE HYDROCHLORIDE 1 MG/ML
0.5 INJECTION, SOLUTION INTRAMUSCULAR; INTRAVENOUS; SUBCUTANEOUS EVERY 5 MIN PRN
Status: DISCONTINUED | OUTPATIENT
Start: 2024-04-15 | End: 2024-04-15 | Stop reason: HOSPADM

## 2024-04-15 RX ORDER — SODIUM CHLORIDE 9 MG/ML
INJECTION, SOLUTION INTRAMUSCULAR; INTRAVENOUS; SUBCUTANEOUS PRN
Status: DISCONTINUED | OUTPATIENT
Start: 2024-04-15 | End: 2024-04-15 | Stop reason: ALTCHOICE

## 2024-04-15 RX ORDER — FENTANYL CITRATE 50 UG/ML
25 INJECTION, SOLUTION INTRAMUSCULAR; INTRAVENOUS EVERY 5 MIN PRN
Status: DISCONTINUED | OUTPATIENT
Start: 2024-04-15 | End: 2024-04-15 | Stop reason: HOSPADM

## 2024-04-15 RX ORDER — MODAFINIL 200 MG/1
200 TABLET ORAL DAILY
Status: DISCONTINUED | OUTPATIENT
Start: 2024-04-15 | End: 2024-04-16 | Stop reason: HOSPADM

## 2024-04-15 RX ORDER — PROPOFOL 10 MG/ML
INJECTION, EMULSION INTRAVENOUS PRN
Status: DISCONTINUED | OUTPATIENT
Start: 2024-04-15 | End: 2024-04-15 | Stop reason: SDUPTHER

## 2024-04-15 RX ADMIN — FENTANYL CITRATE 25 MCG: 50 INJECTION INTRAMUSCULAR; INTRAVENOUS at 15:17

## 2024-04-15 RX ADMIN — SODIUM CHLORIDE, PRESERVATIVE FREE 10 ML: 5 INJECTION INTRAVENOUS at 21:12

## 2024-04-15 RX ADMIN — SODIUM CHLORIDE, POTASSIUM CHLORIDE, SODIUM LACTATE AND CALCIUM CHLORIDE: 600; 310; 30; 20 INJECTION, SOLUTION INTRAVENOUS at 13:14

## 2024-04-15 RX ADMIN — SODIUM CHLORIDE, POTASSIUM CHLORIDE, SODIUM LACTATE AND CALCIUM CHLORIDE: 600; 310; 30; 20 INJECTION, SOLUTION INTRAVENOUS at 11:24

## 2024-04-15 RX ADMIN — HYDROCODONE BITARTRATE AND ACETAMINOPHEN 2 TABLET: 5; 325 TABLET ORAL at 18:00

## 2024-04-15 RX ADMIN — EPHEDRINE SULFATE 10 MG: 5 INJECTION INTRAVENOUS at 13:04

## 2024-04-15 RX ADMIN — DAPAGLIFLOZIN 10 MG: 10 TABLET, FILM COATED ORAL at 21:10

## 2024-04-15 RX ADMIN — CEFAZOLIN 2 G: 1 INJECTION, POWDER, FOR SOLUTION INTRAMUSCULAR; INTRAVENOUS at 12:46

## 2024-04-15 RX ADMIN — LIDOCAINE HYDROCHLORIDE 80 MG: 20 INJECTION, SOLUTION EPIDURAL; INFILTRATION; INTRACAUDAL; PERINEURAL at 12:34

## 2024-04-15 RX ADMIN — Medication 100 MCG: at 13:57

## 2024-04-15 RX ADMIN — FENTANYL CITRATE 25 MCG: 50 INJECTION INTRAMUSCULAR; INTRAVENOUS at 12:34

## 2024-04-15 RX ADMIN — ROCURONIUM BROMIDE 50 MG: 10 INJECTION, SOLUTION INTRAVENOUS at 12:34

## 2024-04-15 RX ADMIN — Medication 100 MCG: at 14:37

## 2024-04-15 RX ADMIN — Medication 100 MCG: at 13:27

## 2024-04-15 RX ADMIN — Medication 100 MCG: at 13:22

## 2024-04-15 RX ADMIN — Medication 200 MCG: at 13:08

## 2024-04-15 RX ADMIN — Medication 100 MCG: at 13:51

## 2024-04-15 RX ADMIN — Medication 100 MCG: at 14:15

## 2024-04-15 RX ADMIN — OXYCODONE HYDROCHLORIDE 10 MG: 10 TABLET, FILM COATED, EXTENDED RELEASE ORAL at 21:11

## 2024-04-15 RX ADMIN — Medication 100 MCG: at 13:30

## 2024-04-15 RX ADMIN — GLYCOPYRROLATE 0.3 MG: 1 INJECTION INTRAMUSCULAR; INTRAVENOUS at 13:06

## 2024-04-15 RX ADMIN — Medication 100 MCG: at 13:07

## 2024-04-15 RX ADMIN — DOCUSATE SODIUM 50MG AND SENNOSIDES 8.6MG 1 TABLET: 8.6; 5 TABLET, FILM COATED ORAL at 21:13

## 2024-04-15 RX ADMIN — EPHEDRINE SULFATE 2.5 MG: 5 INJECTION INTRAVENOUS at 13:52

## 2024-04-15 RX ADMIN — ONDANSETRON 4 MG: 2 INJECTION INTRAMUSCULAR; INTRAVENOUS at 15:11

## 2024-04-15 RX ADMIN — FENTANYL CITRATE 25 MCG: 50 INJECTION INTRAMUSCULAR; INTRAVENOUS at 15:20

## 2024-04-15 RX ADMIN — PROPOFOL 150 MG: 10 INJECTION, EMULSION INTRAVENOUS at 12:34

## 2024-04-15 RX ADMIN — Medication 100 MCG: at 13:52

## 2024-04-15 RX ADMIN — SUGAMMADEX 200 MG: 100 INJECTION, SOLUTION INTRAVENOUS at 15:26

## 2024-04-15 RX ADMIN — EPHEDRINE SULFATE 2.5 MG: 5 INJECTION INTRAVENOUS at 13:30

## 2024-04-15 RX ADMIN — SODIUM CHLORIDE: 9 INJECTION, SOLUTION INTRAVENOUS at 17:20

## 2024-04-15 RX ADMIN — FENTANYL CITRATE 25 MCG: 50 INJECTION INTRAMUSCULAR; INTRAVENOUS at 15:13

## 2024-04-15 RX ADMIN — ATORVASTATIN CALCIUM 40 MG: 40 TABLET, FILM COATED ORAL at 21:10

## 2024-04-15 RX ADMIN — Medication 100 MCG: at 15:00

## 2024-04-15 ASSESSMENT — PAIN DESCRIPTION - LOCATION
LOCATION: BACK

## 2024-04-15 ASSESSMENT — PAIN - FUNCTIONAL ASSESSMENT
PAIN_FUNCTIONAL_ASSESSMENT: NONE - DENIES PAIN
PAIN_FUNCTIONAL_ASSESSMENT: PREVENTS OR INTERFERES WITH MANY ACTIVE NOT PASSIVE ACTIVITIES
PAIN_FUNCTIONAL_ASSESSMENT: 0-10

## 2024-04-15 ASSESSMENT — PAIN SCALES - GENERAL
PAINLEVEL_OUTOF10: 0
PAINLEVEL_OUTOF10: 7
PAINLEVEL_OUTOF10: 2
PAINLEVEL_OUTOF10: 7
PAINLEVEL_OUTOF10: 2

## 2024-04-15 ASSESSMENT — PAIN DESCRIPTION - ORIENTATION
ORIENTATION: LOWER
ORIENTATION: MID

## 2024-04-15 ASSESSMENT — PAIN DESCRIPTION - DESCRIPTORS: DESCRIPTORS: SHARP;STABBING

## 2024-04-15 ASSESSMENT — ENCOUNTER SYMPTOMS
NAUSEA: 0
ABDOMINAL PAIN: 0
PHOTOPHOBIA: 0
SINUS PRESSURE: 0
WHEEZING: 0
SHORTNESS OF BREATH: 0
CONSTIPATION: 0
DIARRHEA: 0
VOMITING: 0
COLOR CHANGE: 0
COUGH: 0
BACK PAIN: 1
SINUS PAIN: 0

## 2024-04-15 NOTE — ANESTHESIA PRE PROCEDURE
disc protrusion with flattening of the spinal cord and effacement of the ventral nerve roots.  Neuroforamina are grossly patent.     There is moderate spinal canal stenosis secondary to central disc herniation at C7-T1.    GI/Hepatic/Renal:   (+) GERD:, renal disease (CKD III): CRI          Endo/Other:    (+) DiabetesType II DM, using insulin, : arthritis:..                  ROS comment: Polymyalgia rheumatica Abdominal:             Vascular: negative vascular ROS.         Other Findings:      PCP note 4/11/2024:  \"Assessment/Plan   Diagnoses and all orders for this visit:  Preoperative clearance  Type 2 diabetes mellitus with hyperglycemia, with long-term current use of insulin (CMS/[facility])  -     insulin glargine (Lantus SoloStar) 100 UNIT/ML pen; Inject 40 Units under the skin at bedtime  -     dapagliflozin (Farxiga) 10 MG; Take 1 tablet (10 mg) by mouth Daily  Primary osteoarthritis, unspecified site  DDD (degenerative disc disease), lumbar  Lumbar stenosis with neurogenic claudication  Frequent falls  Primary hypertension (CMS/[facility])  Class 1 obesity due to excess calories with serious comorbidity and body mass index (BMI) of 31.0 to 31.9 in adult\"       Anesthesia Plan      general     ASA 3       Induction: intravenous.    MIPS: Postoperative opioids intended, Prophylactic antiemetics administered and Postoperative trial extubation.  Anesthetic plan and risks discussed with patient.                    YO BARAJAS MD   4/15/2024

## 2024-04-15 NOTE — BRIEF OP NOTE
Brief Postoperative Note      Patient: Jeni Engel  YOB: 1945  MRN: [id number]    Date of Procedure: 4/15/2024    Pre-Op Diagnosis Codes:     * Spinal stenosis of lumbar region with neurogenic claudication [M48.062]  L3-S1    Post-Op Diagnosis: Same       Procedure(s):  L3-S1 LUMBAR LAMINECTOMY POSTERIOR, CELL SAVER    Surgeon(s):  Quincy Vasquez MD    Assistant:  Surgical Assistant: Chadd Lane    Anesthesia: General    Estimated Blood Loss (mL): 150ml    Complications: None    Specimens:   * No specimens in log *    Implants:  * No implants in log *      Drains:   Closed/Suction Drain Inferior Back Accordion (Active)   Dressing Status New dressing applied 04/15/24 1319       Findings:  Infection Present At Time Of Surgery (PATOS) (choose all levels that have infection present):  No infection present    Electronically signed by QUINCY VASQUEZ MD on 4/15/2024 at 3:19 PM

## 2024-04-15 NOTE — DISCHARGE INSTRUCTIONS
No bending or twisting back for 6 weeks  No lifting over 15 pounds for 6 weeks  Dry dressing changes daily x 1wk. Start in 2 days  No NSAIDS x 5 days  Stiches are dissolvable and do not need to be removed  Call for any fevers, wound redness, swelling or drainage after 4 days [phone]  May shower in 2 days  No tub baths for 6 weeks      Quincy Vasquez MD  [facility]  Spine Surgeon  Keep it Clean - Post-Operative Home instructions    These instructions are to help you have the best possible recovery after your surgical procedure. [facility] is here to support you. If you have questions, call [phone] Monday through Friday from 7:30AM to 8:30PM to speak to a nurse. If you need to speak to someone outside of these hours, call your physician.     Incision Do’s and Don’ts  Do wash hands before and after dressing changes or when you have had any contact with your incision. Use hand  or antibacterial soap.  Do keep your incision clean and dry. It’s OK to wash the skin around your incision with mild soap and water.  Do change your dressing as you were told.   Do notify your doctor if the dressing becomes wet or dirty.  Do use a clean washcloth every time when cleaning your incision.   Do sleep on clean linens.  Do keep pets away from incision site.  Don’t sit in a bathtub, pool, or hot tub until your incision is fully closed and any drains are removed.  Don’t scrub, pick, scratch, or pull at your incision.  Don’t use oils, lotions, or creams on your incision unless your healthcare provider approves it.    Follow-up  You will have one or more follow-up visits with your healthcare provider. These are needed to check how well you’re healing. Your drain, stitches, or staples may also be removed during these visits. Do not miss your follow-up visit, even if you are feeling better.      Call your healthcare provider right away if you have the following:  Fever of 100.4°F (38°C) or higher, or as

## 2024-04-15 NOTE — ANESTHESIA POSTPROCEDURE EVALUATION
Department of Anesthesiology  Postprocedure Note    Patient: Jeni Engel  MRN: [id number]  YOB: 1945  Date of evaluation: 4/15/2024    Procedure Summary       Date: 04/15/24 Room / Location: [address]    Anesthesia Start: 1230 Anesthesia Stop: 1543    Procedure: L3-S1 LUMBAR LAMINECTOMY POSTERIOR, CELL SAVER (Spine Lumbar) Diagnosis:       Spinal stenosis of lumbar region with neurogenic claudication      (Spinal stenosis of lumbar region with neurogenic claudication [M48.062])    Surgeons: Quincy Vasquez MD Responsible Provider: Gerhard Mcgowan MD    Anesthesia Type: General ASA Status: 3            Anesthesia Type: General    Lainey Phase I: Lainey Score: 8    Lainey Phase II:      Anesthesia Post Evaluation    Patient location during evaluation: PACU  Patient participation: complete - patient participated  Level of consciousness: awake  Airway patency: patent  Nausea & Vomiting: no nausea  Cardiovascular status: blood pressure returned to baseline  Respiratory status: acceptable  Hydration status: euvolemic  Comments: Multimodal analgesia pain management as indicated by procedure  Multimodal analgesia pain management approach  Pain management: adequate    No notable events documented.

## 2024-04-15 NOTE — H&P
Interval H&P Note    Pt Name: Jeni Engel  MRN: [id number]  YOB: 1945  Date of evaluation: 4/15/2024      [x] I have reviewed the hard copy orthopedic surgery progress note by Dr. Vasquez dated 04/4/2024, labeled in chart for an Interval History and Physical note.     [x] I have examined  Jeni Engel, a 79 y.o. female.There are no changes to the patient who is scheduled for L3-S1 LUMBAR LAMINECTOMY POSTERIOR, CELL SAVER by Quincy Vasquez MD for Spinal stenosis of lumbar region with neurogenic claudication. The patient denies new health changes, fever, chills, wheezing, cough, increased SOB, chest pain, open sores or wounds. Hx of diabetes, POC . Denies any current blood thinning medications.     Patient received medical clearance for planned procedure.     Vital signs: BP (!) 143/91   Pulse 72   Temp 97.3 °F (36.3 °C)   Resp 18   Wt 86.1 kg (189 lb 12.8 oz)   SpO2 94%   BMI 36.46 kg/m²     Allergies:  Patient has no known allergies.    Medications:    Prior to Admission medications    Medication Sig Start Date End Date Taking? Authorizing Provider   furosemide (LASIX) 20 MG tablet Take 1 tablet by mouth daily   Yes Amie Morales MD   Dapagliflozin-sAXagliptin 10-5 MG TABS Take 10 mg by mouth daily   Yes Amie Morales MD   busPIRone (BUSPAR) 5 MG tablet Take 1 tablet by mouth every morning    Amie Morales MD   B-D ULTRAFINE III SHORT PEN 31G X 8 MM MISC  8/2/22   Amie Morales MD   buPROPion (WELLBUTRIN XL) 150 MG extended release tablet every morning 8/2/22   Amie Morales MD   traMADol (ULTRAM) 50 MG tablet TAKE 1 TABLET BY MOUTH THREE TIMES A DAY AS NEEDED  Patient not taking: Reported on 4/10/2024 8/15/22   Amie Morales MD   Calcium Carbonate (CALCIUM 600 PO) Take by mouth  Patient not taking: Reported on 4/10/2024    Amie Morales MD   atorvastatin (LIPITOR) 40 MG tablet Take 1 tablet by mouth daily    Amie Morales

## 2024-04-15 NOTE — PROGRESS NOTES
Patient transported to room via PACU RN on stretcher. Patient placed in bed and put on cardiac monitor. Vital signs obtained.   Patient has been oriented to room, educated on how to use call light, and to call for assistance prior to getting up. Patient updated on plan of care and verbalized understanding. Patient is currently resting in bed comfortably. No distress noted.Bed in lowest and locked position. 2 siderails up for safety. Call light within reach.   All lines intact. Will continue to monitor.

## 2024-04-16 VITALS
WEIGHT: 190.6 LBS | TEMPERATURE: 99.1 F | RESPIRATION RATE: 17 BRPM | DIASTOLIC BLOOD PRESSURE: 64 MMHG | HEIGHT: 60 IN | SYSTOLIC BLOOD PRESSURE: 135 MMHG | BODY MASS INDEX: 37.42 KG/M2 | OXYGEN SATURATION: 92 % | HEART RATE: 78 BPM

## 2024-04-16 LAB
ANION GAP SERPL CALCULATED.3IONS-SCNC: 9 MMOL/L (ref 9–17)
BASOPHILS # BLD: 0.08 K/UL (ref 0–0.2)
BASOPHILS NFR BLD: 1 % (ref 0–2)
BUN SERPL-MCNC: 16 MG/DL (ref 8–23)
BUN/CREAT SERPL: 13 (ref 9–20)
CALCIUM SERPL-MCNC: 8.3 MG/DL (ref 8.6–10.4)
CHLORIDE SERPL-SCNC: 106 MMOL/L (ref 98–107)
CO2 SERPL-SCNC: 24 MMOL/L (ref 20–31)
CREAT SERPL-MCNC: 1.2 MG/DL (ref 0.5–0.9)
EOSINOPHIL # BLD: 0.07 K/UL (ref 0–0.44)
EOSINOPHILS RELATIVE PERCENT: 1 % (ref 1–4)
ERYTHROCYTE [DISTWIDTH] IN BLOOD BY AUTOMATED COUNT: 12.9 % (ref 11.8–14.4)
GFR SERPL CREATININE-BSD FRML MDRD: 47 ML/MIN/1.73M2
GLUCOSE SERPL-MCNC: 175 MG/DL (ref 70–99)
HCT VFR BLD AUTO: 31.5 % (ref 36.3–47.1)
HGB BLD-MCNC: 9.7 G/DL (ref 11.9–15.1)
IMM GRANULOCYTES # BLD AUTO: 0.06 K/UL (ref 0–0.3)
IMM GRANULOCYTES NFR BLD: 1 %
LYMPHOCYTES NFR BLD: 1.25 K/UL (ref 1.1–3.7)
LYMPHOCYTES RELATIVE PERCENT: 11 % (ref 24–43)
MCH RBC QN AUTO: 30.6 PG (ref 25.2–33.5)
MCHC RBC AUTO-ENTMCNC: 30.8 G/DL (ref 28.4–34.8)
MCV RBC AUTO: 99.4 FL (ref 82.6–102.9)
MONOCYTES NFR BLD: 1.31 K/UL (ref 0.1–1.2)
MONOCYTES NFR BLD: 12 % (ref 3–12)
NEUTROPHILS NFR BLD: 76 % (ref 36–65)
NEUTS SEG NFR BLD: 8.65 K/UL (ref 1.5–8.1)
NRBC BLD-RTO: 0 PER 100 WBC
PLATELET # BLD AUTO: 286 K/UL (ref 138–453)
PMV BLD AUTO: 9.8 FL (ref 8.1–13.5)
POTASSIUM SERPL-SCNC: 4.2 MMOL/L (ref 3.7–5.3)
RBC # BLD AUTO: 3.17 M/UL (ref 3.95–5.11)
SODIUM SERPL-SCNC: 139 MMOL/L (ref 135–144)
WBC OTHER # BLD: 11.4 K/UL (ref 3.5–11.3)

## 2024-04-16 PROCEDURE — 80048 BASIC METABOLIC PNL TOTAL CA: CPT

## 2024-04-16 PROCEDURE — 97162 PT EVAL MOD COMPLEX 30 MIN: CPT

## 2024-04-16 PROCEDURE — 6360000002 HC RX W HCPCS: Performed by: ORTHOPAEDIC SURGERY

## 2024-04-16 PROCEDURE — 97530 THERAPEUTIC ACTIVITIES: CPT

## 2024-04-16 PROCEDURE — 6370000000 HC RX 637 (ALT 250 FOR IP): Performed by: ORTHOPAEDIC SURGERY

## 2024-04-16 PROCEDURE — 85025 COMPLETE CBC W/AUTO DIFF WBC: CPT

## 2024-04-16 PROCEDURE — 97116 GAIT TRAINING THERAPY: CPT

## 2024-04-16 PROCEDURE — 6370000000 HC RX 637 (ALT 250 FOR IP)

## 2024-04-16 PROCEDURE — 36415 COLL VENOUS BLD VENIPUNCTURE: CPT

## 2024-04-16 PROCEDURE — 99221 1ST HOSP IP/OBS SF/LOW 40: CPT

## 2024-04-16 PROCEDURE — 2580000003 HC RX 258: Performed by: ORTHOPAEDIC SURGERY

## 2024-04-16 RX ORDER — CALCIUM CARBONATE 500 MG/1
500 TABLET, CHEWABLE ORAL 3 TIMES DAILY PRN
Status: DISCONTINUED | OUTPATIENT
Start: 2024-04-16 | End: 2024-04-16 | Stop reason: HOSPADM

## 2024-04-16 RX ORDER — HYDROCODONE BITARTRATE AND ACETAMINOPHEN 5; 325 MG/1; MG/1
1-2 TABLET ORAL EVERY 4 HOURS PRN
Qty: 30 TABLET | Refills: 0 | Status: ON HOLD | OUTPATIENT
Start: 2024-04-16 | End: 2024-04-23

## 2024-04-16 RX ORDER — METHOCARBAMOL 750 MG/1
750 TABLET, FILM COATED ORAL EVERY 8 HOURS PRN
Qty: 50 TABLET | Refills: 0 | Status: SHIPPED | OUTPATIENT
Start: 2024-04-16 | End: 2024-04-20 | Stop reason: ALTCHOICE

## 2024-04-16 RX ORDER — SENNA AND DOCUSATE SODIUM 50; 8.6 MG/1; MG/1
1 TABLET, FILM COATED ORAL 2 TIMES DAILY
Qty: 60 TABLET | Refills: 0 | Status: ON HOLD | OUTPATIENT
Start: 2024-04-16

## 2024-04-16 RX ADMIN — HYDROCODONE BITARTRATE AND ACETAMINOPHEN 2 TABLET: 5; 325 TABLET ORAL at 16:03

## 2024-04-16 RX ADMIN — ESCITALOPRAM OXALATE 20 MG: 10 TABLET ORAL at 09:40

## 2024-04-16 RX ADMIN — DAPAGLIFLOZIN 10 MG: 10 TABLET, FILM COATED ORAL at 09:10

## 2024-04-16 RX ADMIN — Medication 2000 MG: at 00:07

## 2024-04-16 RX ADMIN — FAMOTIDINE 20 MG: 20 TABLET, FILM COATED ORAL at 09:09

## 2024-04-16 RX ADMIN — HYDROCODONE BITARTRATE AND ACETAMINOPHEN 1 TABLET: 5; 325 TABLET ORAL at 09:07

## 2024-04-16 RX ADMIN — SODIUM CHLORIDE: 9 INJECTION, SOLUTION INTRAVENOUS at 00:02

## 2024-04-16 RX ADMIN — ATORVASTATIN CALCIUM 40 MG: 40 TABLET, FILM COATED ORAL at 09:09

## 2024-04-16 RX ADMIN — METOPROLOL TARTRATE 100 MG: 100 TABLET ORAL at 09:10

## 2024-04-16 RX ADMIN — HYDROCODONE BITARTRATE AND ACETAMINOPHEN 1 TABLET: 5; 325 TABLET ORAL at 00:57

## 2024-04-16 RX ADMIN — AMLODIPINE BESYLATE AND BENAZEPRIL HYDROCHLORIDE 1 CAPSULE: 5; 10 CAPSULE ORAL at 09:08

## 2024-04-16 RX ADMIN — Medication 2000 MG: at 09:01

## 2024-04-16 RX ADMIN — POLYETHYLENE GLYCOL 3350 17 G: 17 POWDER, FOR SOLUTION ORAL at 08:46

## 2024-04-16 RX ADMIN — Medication 500 MG: at 12:49

## 2024-04-16 RX ADMIN — DOCUSATE SODIUM 50MG AND SENNOSIDES 8.6MG 1 TABLET: 8.6; 5 TABLET, FILM COATED ORAL at 08:47

## 2024-04-16 ASSESSMENT — PAIN SCALES - GENERAL
PAINLEVEL_OUTOF10: 4
PAINLEVEL_OUTOF10: 2
PAINLEVEL_OUTOF10: 3
PAINLEVEL_OUTOF10: 8
PAINLEVEL_OUTOF10: 4
PAINLEVEL_OUTOF10: 5

## 2024-04-16 ASSESSMENT — PAIN DESCRIPTION - FREQUENCY: FREQUENCY: CONTINUOUS

## 2024-04-16 ASSESSMENT — PAIN - FUNCTIONAL ASSESSMENT: PAIN_FUNCTIONAL_ASSESSMENT: PREVENTS OR INTERFERES SOME ACTIVE ACTIVITIES AND ADLS

## 2024-04-16 ASSESSMENT — PAIN DESCRIPTION - DESCRIPTORS
DESCRIPTORS: DISCOMFORT;ACHING
DESCRIPTORS: DISCOMFORT
DESCRIPTORS: DISCOMFORT;CRAMPING;ACHING

## 2024-04-16 ASSESSMENT — PAIN DESCRIPTION - LOCATION
LOCATION: BACK

## 2024-04-16 ASSESSMENT — ENCOUNTER SYMPTOMS
COUGH: 0
NAUSEA: 0
SHORTNESS OF BREATH: 0
WHEEZING: 0
VOMITING: 0
ABDOMINAL PAIN: 0

## 2024-04-16 ASSESSMENT — PAIN DESCRIPTION - PAIN TYPE: TYPE: ACUTE PAIN

## 2024-04-16 ASSESSMENT — PAIN DESCRIPTION - ONSET: ONSET: PROGRESSIVE

## 2024-04-16 NOTE — PROGRESS NOTES
Physical Therapy  Facility/Department: [facility]  Daily Treatment Note  NAME: Jeni Engel  : 1945  MRN: [id number]    Date of Service: 2024    Discharge Recommendations:  Patient would benefit from continued therapy after discharge        Patient Diagnosis(es): The encounter diagnosis was Lumbar stenosis with neurogenic claudication.    Assessment   Assessment: pt tolerated treatment well, fatigued quickly with ambulation. Pt required assist with stair management due to weakness on R LE   Activity Tolerance: Patient tolerated treatment well     Plan    Physical Therapy Plan  General Plan: 1 time a day 7 days a week  Specific Instructions for Next Treatment: walking; stairs and safety  Current Treatment Recommendations: Functional mobility training;Stair training;Transfer training;Wheelchair mobility training;Gait training;Home exercise program;Safety education & training;Positioning;Therapeutic activities     Restrictions  Restrictions/Precautions  Restrictions/Precautions: Fall Risk, Up as Tolerated  Position Activity Restriction  Other position/activity restrictions: drain - spine; bandage saturated     Subjective    Subjective  Pain: no formal rating of pain was given, pt states she had increased soreness in lumbar region  Orientation  Overall Orientation Status: Within Normal Limits  Cognition  Overall Cognitive Status: WNL     Objective   Vitals     Bed Mobility Training  Bed Mobility Training: No (bed mobility was not observed due to pt starting and ending the session in the recliner)  Balance  Sitting: Intact  Standing: Impaired  Standing - Static: Constant support  Standing - Dynamic: Constant support (pt requies BUE assist with all standing activity)  Transfer Training  Transfer Training: Yes  Overall Level of Assistance: Contact-guard assistance  Interventions: Verbal cues;Tactile cues  Sit to Stand: Contact-guard assistance (pt demo'd difficulty with scooting to the edge of the seat due

## 2024-04-16 NOTE — PROGRESS NOTES
[facility]  Attending Progress Note  4/16/2024  7:52 AM     Jeni Engel    1945   [id number]      SUBJECTIVE:  doing well. Pain controlled. Still unsteady gait but that is her baseline.  Using walker well. Denies leg symptoms. No CP/SOB    OBJECTIVE      Physical      VITALS:  /65   Pulse 77   Temp 97.3 °F (36.3 °C) (Oral)   Resp 18   Ht 1.524 m (5')   Wt 86.5 kg (190 lb 9.6 oz)   SpO2 92%   BMI 37.22 kg/m²     Dressing C/D/I    NEUROLOGIC: Alert and Oriented x 3.  Strength 5/5 HF, 5/5 Q, 5/5 TA, 5/5 EHL, 5/5 GS. 5/5 D, 5/5 B, 5/5 T, 5/5 WE, 5/5 WF, 5/5 I                                                                  Sensation intact.     Data  CBC with Differential:    Lab Results   Component Value Date/Time    WBC 11.4 04/16/2024 06:01 AM    RBC 3.17 04/16/2024 06:01 AM    RBC 3.75 02/05/2024 04:13 PM    HGB 9.7 04/16/2024 06:01 AM    HCT 31.5 04/16/2024 06:01 AM     04/16/2024 06:01 AM    MCV 99.4 04/16/2024 06:01 AM    MCH 30.6 04/16/2024 06:01 AM    MCHC 30.8 04/16/2024 06:01 AM    RDW 12.9 04/16/2024 06:01 AM    LYMPHOPCT 11 04/16/2024 06:01 AM    MONOPCT 12 04/16/2024 06:01 AM    EOSPCT 0.2 11/30/2023 02:09 PM    BASOPCT 1 04/16/2024 06:01 AM    MONOSABS 1.31 04/16/2024 06:01 AM    LYMPHSABS 1.25 04/16/2024 06:01 AM    EOSABS 0.07 04/16/2024 06:01 AM    BASOSABS 0.08 04/16/2024 06:01 AM    DIFFTYPE NOT REPORTED 02/26/2020 01:55 PM     BMP:    Lab Results   Component Value Date/Time     04/16/2024 06:01 AM    K 4.2 04/16/2024 06:01 AM     04/16/2024 06:01 AM    CO2 24 04/16/2024 06:01 AM    BUN 16 04/16/2024 06:01 AM    LABALBU 4.2 04/10/2024 07:49 AM    LABALBU 3.7 02/05/2024 04:13 PM    CREATININE 1.2 04/16/2024 06:01 AM    CALCIUM 8.3 04/16/2024 06:01 AM    GFRAA 47.3 02/05/2024 04:13 PM    LABGLOM 47 04/16/2024 06:01 AM    GLUCOSE 175 04/16/2024 06:01 AM    GLUCOSE 290 11/30/2023 02:09 PM           Current Inpatient Medications    Current

## 2024-04-16 NOTE — PROGRESS NOTES
Pt discharged home with Yennifer in stable condition with belongings  Discharge instructions given  \"Meds To Beds\" medication at bedside  Pt denies having any further questions at this time  Locked up home medication(s)/personal items given to patient at discharge  Patient/family state they have everything they were admitted with.  Dressing change supplies and hibiclens sent home with patient

## 2024-04-16 NOTE — PROGRESS NOTES
Nutrition Note    Positive nutrition screen related to [employer] preference of being [employer]. Seen patient who stated no dietary preferences related to this. Will monitor and follow up as a length of stay.     Electronically signed by Cesario Michel RD on 4/16/24 at 1:41 PM EDT    Contact: [phone]

## 2024-04-16 NOTE — PLAN OF CARE
Problem: Chronic Conditions and Co-morbidities  Goal: Patient's chronic conditions and co-morbidity symptoms are monitored and maintained or improved  4/16/2024 1608 by Sheron Hines RN  Outcome: Adequate for Discharge  4/16/2024 1600 by Sheron Hines RN  Outcome: Progressing  Flowsheets (Taken 4/16/2024 1600)  Care Plan - Patient's Chronic Conditions and Co-Morbidity Symptoms are Monitored and Maintained or Improved:   Monitor and assess patient's chronic conditions and comorbid symptoms for stability, deterioration, or improvement   Collaborate with multidisciplinary team to address chronic and comorbid conditions and prevent exacerbation or deterioration   Update acute care plan with appropriate goals if chronic or comorbid symptoms are exacerbated and prevent overall improvement and discharge  4/16/2024 0324 by Torey Gonzales RN  Outcome: Progressing     Problem: Discharge Planning  Goal: Discharge to home or other facility with appropriate resources  4/16/2024 1608 by Sheron Hines RN  Outcome: Adequate for Discharge  4/16/2024 1600 by Sheron Hines RN  Outcome: Progressing  Flowsheets (Taken 4/16/2024 1600)  Discharge to home or other facility with appropriate resources:   Identify barriers to discharge with patient and caregiver   Arrange for needed discharge resources and transportation as appropriate   Identify discharge learning needs (meds, wound care, etc)   Refer to discharge planning if patient needs post-hospital services based on physician order or complex needs related to functional status, cognitive ability or social support system  4/16/2024 0324 by Torey Gonzales, RN  Outcome: Progressing     Problem: Pain  Goal: Verbalizes/displays adequate comfort level or baseline comfort level  4/16/2024 1608 by Sheron Hines RN  Outcome: Adequate for Discharge  4/16/2024 1600 by Sheron Hines RN  Outcome: Progressing  Flowsheets (Taken 4/16/2024 1600)  Verbalizes/displays

## 2024-04-16 NOTE — DISCHARGE INSTR - COC
Assisted  Bathing  Independent  Dressing  Independent  Toileting  Assisted  Feeding  Independent  Med Admin  Independent  Med Delivery   whole    Wound Care Documentation and Therapy:  Incision 04/15/24 Back Lower;Medial (Active)   Dressing Status Clean;Dry;Intact 04/16/24 0400   Dressing/Treatment Dry dressing 04/16/24 0400   Drainage Amount Large (50-75% saturated) 04/16/24 0400   Drainage Description Serosanguinous 04/16/24 0400   Number of days: 0        Elimination:  Continence:   Bowel: Yes  Bladder: Yes  Urinary Catheter: None   Colostomy/Ileostomy/Ileal Conduit: No       Date of Last BM: ***    Intake/Output Summary (Last 24 hours) at 4/16/2024 0956  Last data filed at 4/16/2024 0944  Gross per 24 hour   Intake 3370.29 ml   Output 925 ml   Net 2445.29 ml     I/O last 3 completed shifts:  In: 3258.6 [P.O.:50; I.V.:3159.7; IV Piggyback:49]  Out: 925 [Urine:400; Drains:375; Blood:150]    Safety Concerns:     At Risk for Falls    Impairments/Disabilities:      Hearing and status post L3 laminectomy    Nutrition Therapy:  Current Nutrition Therapy:   - Oral Diet:  General    Routes of Feeding: Oral  Liquids: No Restrictions  Daily Fluid Restriction: no  Last Modified Barium Swallow with Video (Video Swallowing Test): not done    Treatments at the Time of Hospital Discharge:   Respiratory Treatments: N/A  Oxygen Therapy:  not on home oxygen  Ventilator:    - No ventilator support    Rehab Therapies: Physical Therapy and Occupational Therapy  Weight Bearing Status/Restrictions: No weight bearing restrictions  Other Medical Equipment (for information only, NOT a DME order):  walker  Other Treatments:   SN   Med teaching     Patient's personal belongings (please select all that are sent with patient):  Hearing Aides bilateral    RN SIGNATURE:  Electronically signed by YUSUF JUAREZ RN on 4/16/24 at 12:38 PM EDT    CASE MANAGEMENT/SOCIAL WORK SECTION    Inpatient Status Date: ***    Readmission Risk Assessment

## 2024-04-16 NOTE — PROGRESS NOTES
Physical Therapy  Facility/Department: [facility]  Physical Therapy Initial Assessment    Name: Jeni Engel  : 1945  MRN: [id number]  Date of Service: 2024    Discharge Recommendations:  Patient would benefit from continued therapy after discharge          Patient Diagnosis(es): The encounter diagnosis was Lumbar stenosis with neurogenic claudication.  Past Medical History:  has a past medical history of Acid reflux disease, Arthritis, Chronic cholecystitis, Chronic kidney disease, Depression, Diabetes mellitus (HCC), Dizziness, Falls frequently, Glaucoma, Hard of hearing, Hx of bilateral cataract extraction, Hyperlipidemia, Hypertension, Multiple thyroid nodules, PMR (polymyalgia rheumatica) ([facility]), Sleep apnea, Vitamin D deficiency, and Wears glasses.  Past Surgical History:  has a past surgical history that includes eye surgery (Bilateral); Hysterectomy (); Nephrostomy (); Ureter stent placement (Right, ); Knee arthroscopy (Right); Finger trigger release; Colonoscopy; Cervical spine surgery; Cholecystectomy, laparoscopic (N/A, 03/10/2020); Shoulder Arthroplasty (Right); and laminectomy (N/A, 4/15/2024).    Assessment   Body Structures, Functions, Activity Limitations Requiring Skilled Therapeutic Intervention: Decreased functional mobility ;Decreased strength;Decreased safe awareness;Decreased ROM;Decreased balance    Assessment: Pt presents post op day one with good pain control. Pt is motivated for home discharge and states over 6 times that she wants to go home and that her family and friends are in the medical field and will be able to assist her. Pt with some issues with balance with mobiltiy for first session.  Per patient, she has been dragging her legs at times prior to surgery and has fallen 2 weeks ago. Would suggest family assist at time of discharge for safety.  Will continue to progress therapy while hospitalized.    Specific Instructions for Next Treatment: walking;

## 2024-04-16 NOTE — CONSULTS
drainage and patient is reassured.  She complains that her postop pain is so severe she has concerns about moving and being able to sleep tonight.  She also has a history of diabetes, heart disease, high cholesterol.  Home medication regimen has been initiated by primary.  Internal medicine agrees with these medications being continued.    Past Medical History:     Past Medical History:   Diagnosis Date    Acid reflux disease     Arthritis     OA    Chronic cholecystitis     Chronic kidney disease     stage 2 per patient    Depression     Diabetes mellitus (HCC) 2003    type 2    Dizziness     Falls frequently     patient states her legs give out    Glaucoma     both eyes per patient    Hard of hearing     wears bilateral hearing aids    Hx of bilateral cataract extraction     Hyperlipidemia     Hypertension     Multiple thyroid nodules     \"have been watching them for years\"    PMR (polymyalgia rheumatica) ([facility])     Sleep apnea     does not use machine    Vitamin D deficiency     patient states better    Wears glasses     for reading        Past Surgical History:     Past Surgical History:   Procedure Laterality Date    CERVICAL SPINE SURGERY       anterior  with hardware C4-C5    CHOLECYSTECTOMY, LAPAROSCOPIC N/A 03/10/2020    CHOLECYSTECTOMY LAPAROSCOPIC ROBOTIC XI performed by Davidson Moon MD at [facility] OR    COLONOSCOPY      x 2    EYE SURGERY Bilateral     monovision    FINGER TRIGGER RELEASE      right index, middle finger, left middle finger    HYSTERECTOMY (CERVIX STATUS UNKNOWN)  1989    with BSO    KNEE ARTHROSCOPY Right     NEPHROSTOMY  1990    and removal    SHOULDER ARTHROPLASTY Right     right reverse    URETER STENT PLACEMENT Right 1989        Medications Prior to Admission:     Prior to Admission medications    Medication Sig Start Date End Date Taking? Authorizing Provider   furosemide (LASIX) 20 MG tablet Take 1 tablet by mouth as needed (swelling)   Yes Provider, MD Amie 
Yes Quincy Vasquez MD   sennosides-docusate sodium (SENOKOT-S) 8.6-50 MG tablet Take 1 tablet by mouth 2 times daily 4/16/24  Yes Quincy Vasquez MD   furosemide (LASIX) 20 MG tablet Take 1 tablet by mouth as needed (swelling)   Yes Amie Morales MD   Dapagliflozin-sAXagliptin 10-5 MG TABS Take 10 mg by mouth daily   Yes Amie Morales MD   dapagliflozin (FARXIGA) 10 MG tablet Take 1 tablet by mouth daily 4/15/24  Yes Amie Morales MD   LANTUS SOLOSTAR 100 UNIT/ML injection pen Inject 40 Units into the skin nightly 4/11/24 10/8/24 Yes Amie Morales MD   buPROPion (WELLBUTRIN XL) 300 MG extended release tablet Take 1 tablet by mouth every morning 4/11/24  Yes Amie Morales MD   LEVEMIR FLEXPEN 100 UNIT/ML injection pen Inject 38 Units into the skin every morning (before breakfast) 3/18/24  Yes Amie Morales MD   busPIRone (BUSPAR) 5 MG tablet Take 1 tablet by mouth every morning    Amie Morales MD B-VESTA ULTRAFINE III SHORT PEN 31G X 8 MM MISC  8/2/22   Amie Morales MD   atorvastatin (LIPITOR) 40 MG tablet Take 1 tablet by mouth daily    Amie Morales MD   Biotin 1 MG CAPS Take 1 capsule by mouth daily    Amie Morales MD   famotidine (PEPCID) 20 MG tablet  8/19/21   Amie Morales MD   amLODIPine-benazepril (LOTREL) 5-10 MG per capsule Take 1 capsule by mouth every evening    Amie Morales MD   metoprolol (LOPRESSOR) 100 MG tablet Take 1 tablet by mouth daily    Amie Morales MD   modafinil (PROVIGIL) 200 MG tablet Take 1 tablet by mouth daily.    Amie Morales MD   escitalopram (LEXAPRO) 20 MG tablet Take 1 tablet by mouth daily    Amie Morales MD   Insulin Detemir (LEVEMIR SC) Inject 38 Units into the skin every morning    Amie Morales MD   insulin lispro (HUMALOG) 100 UNIT/ML injection vial Inject 8 Units into the skin 3 times daily (before meals)    Amie Morales MD

## 2024-04-16 NOTE — CARE COORDINATION
DC Ingrid De Oliveira accepted. GRACE NEED SIGNED.   
Care is related to the following treatment goals of Spinal stenosis of lumbar region with neurogenic claudication [M48.062]  Lumbar stenosis with neurogenic claudication [M48.062]    IF APPLICABLE: The Patient and/or patient representative Jeni and her family were provided with a choice of provider and agrees with the discharge plan. Freedom of choice list with basic dialogue that supports the patient's individualized plan of care/goals and shares the quality data associated with the providers was provided to:     Patient Representative Name:       The Patient and/or Patient Representative Agree with the Discharge Plan?      Patricia Will RN  Case Management Department  Ph:  Fax:

## 2024-04-16 NOTE — PROGRESS NOTES
End Of Shift Note  [facility]  Summary of shift: Pain managed well overnight. Able to get to edge of bed and walk to the bathroom with walker, unsteady gait. 335ml out of wound, dressing is intact, but 4x4 is saturated towards end of shift.       Vitals:    Vitals:    04/15/24 1830 04/15/24 1922 04/16/24 0449 04/16/24 0616   BP:  136/72 134/71    Pulse:  73 78    Resp: 18 18 19    Temp:  97.5 °F (36.4 °C) 97.7 °F (36.5 °C)    TempSrc:  Oral Oral    SpO2:  97% (!) 89%    Weight:    86.5 kg (190 lb 9.6 oz)   Height:            I&O:   Intake/Output Summary (Last 24 hours) at 4/16/2024 0654  Last data filed at 4/16/2024 0651  Gross per 24 hour   Intake 3258.64 ml   Output 925 ml   Net 2333.64 ml       Resp Status: 2L NC    Ventilator Settings:     / / /     Critical Care IV infusions:   dextrose      sodium chloride 125 mL/hr at 04/16/24 0617    sodium chloride          LDA:   Peripheral IV 04/15/24 Posterior;Right Hand (Active)   Number of days: 0       Peripheral IV 04/15/24 Left;Anterior Forearm (Active)   Number of days: 1       Closed/Suction Drain Inferior Back Accordion (Active)   Number of days: 1       Incision 04/15/24 Back Lower;Medial (Active)   Number of days: 0

## 2024-04-16 NOTE — OP NOTE
fashion.  A  spinal needle was used to localize the L5-S1 level and C-arm was  brought in and this area was marked.  The area was then  infiltrated with 0.5% Marcaine with epinephrine.  Approximately,  a 4 to 5 cm incision extending down to the subcutaneous tissues.   Once this was done and fully exposed from L3 to S1, [place] was   used to further thin out the lamina and spinous processes   were removed at L4, L5, partial L3 and the superior aspect of S1. This  was carefully dissected free with a sharp curette.   The laminectomy was then completed.  Partial medial facetectomies  were then performed at L5-S1, L3-4  and L4-5  levels and bilateral  foraminotomies as well.  Once the decompression was complete,  [place] was used to verify that the nerve roots were completely  freed from L3-S1, which they were.  The wound was then irrigated  with sterile saline and bacitracin. Retractor was removed and closure was then performed  with 0 Vicryl, 2-0 Vicryl, and a running 4-0 Monocryl suture with  Dermabond glue.  Standard dressings were then applied.  She was  then placed supine on the bed, extubated, and taken to recovery  room in stable condition.    Electronically signed by THOMAS PETERSEN MD on 4/15/2024 at 8:41 PM

## 2024-04-20 ENCOUNTER — APPOINTMENT (OUTPATIENT)
Dept: CT IMAGING | Age: 79
DRG: 696 | End: 2024-04-20
Payer: MEDICARE

## 2024-04-20 ENCOUNTER — HOSPITAL ENCOUNTER (INPATIENT)
Age: 79
LOS: 3 days | Discharge: NURSING, REHAB OR LONG-TERM CARE | DRG: 696 | End: 2024-04-23
Attending: EMERGENCY MEDICINE | Admitting: FAMILY MEDICINE
Payer: MEDICARE

## 2024-04-20 DIAGNOSIS — R41.82 ALTERED MENTAL STATUS, UNSPECIFIED ALTERED MENTAL STATUS TYPE: ICD-10-CM

## 2024-04-20 DIAGNOSIS — R33.8 ACUTE URINARY RETENTION: Primary | ICD-10-CM

## 2024-04-20 DIAGNOSIS — J32.0 CHRONIC MAXILLARY SINUSITIS: ICD-10-CM

## 2024-04-20 DIAGNOSIS — R79.89 ELEVATED TROPONIN: ICD-10-CM

## 2024-04-20 PROBLEM — R41.0 DELIRIUM: Status: ACTIVE | Noted: 2024-04-20

## 2024-04-20 LAB
ALBUMIN SERPL-MCNC: 3 G/DL (ref 3.5–5.2)
ALP SERPL-CCNC: 115 U/L (ref 35–104)
ALT SERPL-CCNC: 11 U/L (ref 5–33)
AMORPH SED URNS QL MICRO: ABNORMAL
ANION GAP SERPL CALCULATED.3IONS-SCNC: 14 MMOL/L (ref 9–17)
AST SERPL-CCNC: 29 U/L
BASOPHILS # BLD: 0.04 K/UL (ref 0–0.2)
BASOPHILS NFR BLD: 1 % (ref 0–2)
BILIRUB SERPL-MCNC: 0.2 MG/DL (ref 0.3–1.2)
BILIRUB UR QL STRIP: NEGATIVE
BUN SERPL-MCNC: 14 MG/DL (ref 8–23)
BUN/CREAT SERPL: 16 (ref 9–20)
CALCIUM SERPL-MCNC: 9 MG/DL (ref 8.6–10.4)
CHLORIDE SERPL-SCNC: 101 MMOL/L (ref 98–107)
CLARITY UR: CLEAR
CO2 SERPL-SCNC: 22 MMOL/L (ref 20–31)
COLOR UR: YELLOW
CREAT SERPL-MCNC: 0.9 MG/DL (ref 0.5–0.9)
EKG ATRIAL RATE: 73 BPM
EKG P AXIS: 28 DEGREES
EKG P-R INTERVAL: 170 MS
EKG Q-T INTERVAL: 420 MS
EKG QRS DURATION: 96 MS
EKG QTC CALCULATION (BAZETT): 462 MS
EKG R AXIS: -36 DEGREES
EKG T AXIS: 39 DEGREES
EKG VENTRICULAR RATE: 73 BPM
EOSINOPHIL # BLD: 0.12 K/UL (ref 0–0.44)
EOSINOPHILS RELATIVE PERCENT: 2 % (ref 1–4)
EPI CELLS #/AREA URNS HPF: ABNORMAL /HPF (ref 0–5)
ERYTHROCYTE [DISTWIDTH] IN BLOOD BY AUTOMATED COUNT: 13.2 % (ref 11.8–14.4)
GFR SERPL CREATININE-BSD FRML MDRD: 66 ML/MIN/1.73M2
GLUCOSE BLD-MCNC: 124 MG/DL (ref 65–105)
GLUCOSE BLD-MCNC: 135 MG/DL (ref 65–105)
GLUCOSE SERPL-MCNC: 165 MG/DL (ref 70–99)
GLUCOSE UR STRIP-MCNC: ABNORMAL MG/DL
HCT VFR BLD AUTO: 47.6 % (ref 36.3–47.1)
HGB BLD-MCNC: 15 G/DL (ref 11.9–15.1)
HGB UR QL STRIP.AUTO: NEGATIVE
IMM GRANULOCYTES # BLD AUTO: 0.04 K/UL (ref 0–0.3)
IMM GRANULOCYTES NFR BLD: 1 %
KETONES UR STRIP-MCNC: NEGATIVE MG/DL
LACTATE BLDV-SCNC: 2.1 MMOL/L (ref 0.5–1.9)
LACTATE BLDV-SCNC: 2.5 MMOL/L (ref 0.5–2.2)
LEUKOCYTE ESTERASE UR QL STRIP: NEGATIVE
LIPASE SERPL-CCNC: 37 U/L (ref 13–60)
LYMPHOCYTES NFR BLD: 0.72 K/UL (ref 1.1–3.7)
LYMPHOCYTES RELATIVE PERCENT: 10 % (ref 24–43)
MCH RBC QN AUTO: 29.9 PG (ref 25.2–33.5)
MCHC RBC AUTO-ENTMCNC: 31.5 G/DL (ref 28.4–34.8)
MCV RBC AUTO: 95 FL (ref 82.6–102.9)
MONOCYTES NFR BLD: 0.69 K/UL (ref 0.1–1.2)
MONOCYTES NFR BLD: 9 % (ref 3–12)
NEUTROPHILS NFR BLD: 79 % (ref 36–65)
NEUTS SEG NFR BLD: 5.87 K/UL (ref 1.5–8.1)
NITRITE UR QL STRIP: NEGATIVE
NRBC BLD-RTO: 0 PER 100 WBC
PH UR STRIP: 6.5 [PH] (ref 5–8)
PLATELET # BLD AUTO: 236 K/UL (ref 138–453)
PMV BLD AUTO: 9.7 FL (ref 8.1–13.5)
POTASSIUM SERPL-SCNC: 3.6 MMOL/L (ref 3.7–5.3)
PROT SERPL-MCNC: 6.2 G/DL (ref 6.4–8.3)
PROT UR STRIP-MCNC: NEGATIVE MG/DL
RBC # BLD AUTO: 5.01 M/UL (ref 3.95–5.11)
RBC #/AREA URNS HPF: ABNORMAL /HPF (ref 0–2)
SODIUM SERPL-SCNC: 137 MMOL/L (ref 135–144)
SP GR UR STRIP: 1.02 (ref 1–1.03)
TROPONIN I SERPL HS-MCNC: 32 NG/L (ref 0–14)
TROPONIN I SERPL HS-MCNC: 39 NG/L (ref 0–14)
TSH SERPL DL<=0.05 MIU/L-ACNC: 0.92 UIU/ML (ref 0.3–5)
UROBILINOGEN UR STRIP-ACNC: NORMAL EU/DL (ref 0–1)
WBC #/AREA URNS HPF: ABNORMAL /HPF (ref 0–5)
WBC OTHER # BLD: 7.5 K/UL (ref 3.5–11.3)

## 2024-04-20 PROCEDURE — 87040 BLOOD CULTURE FOR BACTERIA: CPT

## 2024-04-20 PROCEDURE — 93005 ELECTROCARDIOGRAM TRACING: CPT | Performed by: EMERGENCY MEDICINE

## 2024-04-20 PROCEDURE — 87086 URINE CULTURE/COLONY COUNT: CPT

## 2024-04-20 PROCEDURE — 6370000000 HC RX 637 (ALT 250 FOR IP): Performed by: NURSE PRACTITIONER

## 2024-04-20 PROCEDURE — 2580000003 HC RX 258: Performed by: NURSE PRACTITIONER

## 2024-04-20 PROCEDURE — 51702 INSERT TEMP BLADDER CATH: CPT

## 2024-04-20 PROCEDURE — 6360000002 HC RX W HCPCS

## 2024-04-20 PROCEDURE — 80053 COMPREHEN METABOLIC PANEL: CPT

## 2024-04-20 PROCEDURE — 83605 ASSAY OF LACTIC ACID: CPT

## 2024-04-20 PROCEDURE — 99285 EMERGENCY DEPT VISIT HI MDM: CPT

## 2024-04-20 PROCEDURE — 1200000000 HC SEMI PRIVATE

## 2024-04-20 PROCEDURE — 85025 COMPLETE CBC W/AUTO DIFF WBC: CPT

## 2024-04-20 PROCEDURE — 51798 US URINE CAPACITY MEASURE: CPT

## 2024-04-20 PROCEDURE — 82947 ASSAY GLUCOSE BLOOD QUANT: CPT

## 2024-04-20 PROCEDURE — 96375 TX/PRO/DX INJ NEW DRUG ADDON: CPT

## 2024-04-20 PROCEDURE — 96374 THER/PROPH/DIAG INJ IV PUSH: CPT

## 2024-04-20 PROCEDURE — 2580000003 HC RX 258

## 2024-04-20 PROCEDURE — 74176 CT ABD & PELVIS W/O CONTRAST: CPT

## 2024-04-20 PROCEDURE — 81001 URINALYSIS AUTO W/SCOPE: CPT

## 2024-04-20 PROCEDURE — 6370000000 HC RX 637 (ALT 250 FOR IP)

## 2024-04-20 PROCEDURE — 99222 1ST HOSP IP/OBS MODERATE 55: CPT | Performed by: NURSE PRACTITIONER

## 2024-04-20 PROCEDURE — 70450 CT HEAD/BRAIN W/O DYE: CPT

## 2024-04-20 PROCEDURE — 83690 ASSAY OF LIPASE: CPT

## 2024-04-20 PROCEDURE — 84484 ASSAY OF TROPONIN QUANT: CPT

## 2024-04-20 PROCEDURE — 84443 ASSAY THYROID STIM HORMONE: CPT

## 2024-04-20 RX ORDER — BUPROPION HYDROCHLORIDE 150 MG/1
300 TABLET ORAL EVERY MORNING
Status: DISCONTINUED | OUTPATIENT
Start: 2024-04-21 | End: 2024-04-23 | Stop reason: HOSPADM

## 2024-04-20 RX ORDER — INSULIN GLARGINE 100 [IU]/ML
38 INJECTION, SOLUTION SUBCUTANEOUS
Status: DISCONTINUED | OUTPATIENT
Start: 2024-04-21 | End: 2024-04-23 | Stop reason: HOSPADM

## 2024-04-20 RX ORDER — SODIUM CHLORIDE 9 MG/ML
INJECTION, SOLUTION INTRAVENOUS CONTINUOUS
Status: ACTIVE | OUTPATIENT
Start: 2024-04-20 | End: 2024-04-22

## 2024-04-20 RX ORDER — ONDANSETRON 4 MG/1
4 TABLET, ORALLY DISINTEGRATING ORAL EVERY 8 HOURS PRN
Status: DISCONTINUED | OUTPATIENT
Start: 2024-04-20 | End: 2024-04-23 | Stop reason: HOSPADM

## 2024-04-20 RX ORDER — KETOROLAC TROMETHAMINE 15 MG/ML
15 INJECTION, SOLUTION INTRAMUSCULAR; INTRAVENOUS ONCE
Status: COMPLETED | OUTPATIENT
Start: 2024-04-20 | End: 2024-04-20

## 2024-04-20 RX ORDER — AMLODIPINE BESYLATE 5 MG/1
5 TABLET ORAL DAILY
Status: DISCONTINUED | OUTPATIENT
Start: 2024-04-21 | End: 2024-04-23 | Stop reason: HOSPADM

## 2024-04-20 RX ORDER — FUROSEMIDE 20 MG/1
20 TABLET ORAL DAILY
Status: DISCONTINUED | OUTPATIENT
Start: 2024-04-21 | End: 2024-04-23 | Stop reason: HOSPADM

## 2024-04-20 RX ORDER — ENOXAPARIN SODIUM 100 MG/ML
40 INJECTION SUBCUTANEOUS DAILY
Status: DISCONTINUED | OUTPATIENT
Start: 2024-04-20 | End: 2024-04-20

## 2024-04-20 RX ORDER — MAGNESIUM SULFATE 1 G/100ML
1000 INJECTION INTRAVENOUS PRN
Status: DISCONTINUED | OUTPATIENT
Start: 2024-04-20 | End: 2024-04-23 | Stop reason: HOSPADM

## 2024-04-20 RX ORDER — AMLODIPINE BESYLATE AND BENAZEPRIL HYDROCHLORIDE 5; 10 MG/1; MG/1
1 CAPSULE ORAL EVERY EVENING
Status: DISCONTINUED | OUTPATIENT
Start: 2024-04-20 | End: 2024-04-20

## 2024-04-20 RX ORDER — SODIUM CHLORIDE 9 MG/ML
INJECTION, SOLUTION INTRAVENOUS PRN
Status: DISCONTINUED | OUTPATIENT
Start: 2024-04-20 | End: 2024-04-23 | Stop reason: HOSPADM

## 2024-04-20 RX ORDER — ACETAMINOPHEN 500 MG
1000 TABLET ORAL EVERY 6 HOURS PRN
COMMUNITY

## 2024-04-20 RX ORDER — DEXTROSE MONOHYDRATE 100 MG/ML
INJECTION, SOLUTION INTRAVENOUS CONTINUOUS PRN
Status: DISCONTINUED | OUTPATIENT
Start: 2024-04-20 | End: 2024-04-23 | Stop reason: HOSPADM

## 2024-04-20 RX ORDER — METHOCARBAMOL 750 MG/1
750 TABLET, FILM COATED ORAL DAILY PRN
Status: ON HOLD | COMMUNITY
End: 2024-04-22

## 2024-04-20 RX ORDER — LISINOPRIL 10 MG/1
10 TABLET ORAL DAILY
Status: DISCONTINUED | OUTPATIENT
Start: 2024-04-21 | End: 2024-04-23 | Stop reason: HOSPADM

## 2024-04-20 RX ORDER — SODIUM CHLORIDE 0.9 % (FLUSH) 0.9 %
10 SYRINGE (ML) INJECTION PRN
Status: DISCONTINUED | OUTPATIENT
Start: 2024-04-20 | End: 2024-04-23 | Stop reason: HOSPADM

## 2024-04-20 RX ORDER — ACETAMINOPHEN 650 MG/1
650 SUPPOSITORY RECTAL EVERY 6 HOURS PRN
Status: DISCONTINUED | OUTPATIENT
Start: 2024-04-20 | End: 2024-04-23 | Stop reason: HOSPADM

## 2024-04-20 RX ORDER — POTASSIUM CHLORIDE 20 MEQ/1
40 TABLET, EXTENDED RELEASE ORAL PRN
Status: DISCONTINUED | OUTPATIENT
Start: 2024-04-20 | End: 2024-04-23 | Stop reason: HOSPADM

## 2024-04-20 RX ORDER — CALCIUM CARBONATE 500 MG/1
1 TABLET, CHEWABLE ORAL DAILY
COMMUNITY

## 2024-04-20 RX ORDER — BUSPIRONE HYDROCHLORIDE 5 MG/1
5 TABLET ORAL EVERY MORNING
Status: DISCONTINUED | OUTPATIENT
Start: 2024-04-21 | End: 2024-04-23 | Stop reason: HOSPADM

## 2024-04-20 RX ORDER — SODIUM CHLORIDE 0.9 % (FLUSH) 0.9 %
5-40 SYRINGE (ML) INJECTION EVERY 12 HOURS SCHEDULED
Status: DISCONTINUED | OUTPATIENT
Start: 2024-04-20 | End: 2024-04-23 | Stop reason: HOSPADM

## 2024-04-20 RX ORDER — METOPROLOL TARTRATE 100 MG/1
100 TABLET ORAL DAILY
Status: DISCONTINUED | OUTPATIENT
Start: 2024-04-21 | End: 2024-04-23 | Stop reason: HOSPADM

## 2024-04-20 RX ORDER — ACETAMINOPHEN 325 MG/1
650 TABLET ORAL EVERY 6 HOURS PRN
Status: DISCONTINUED | OUTPATIENT
Start: 2024-04-20 | End: 2024-04-23 | Stop reason: HOSPADM

## 2024-04-20 RX ORDER — POLYETHYLENE GLYCOL 3350 17 G/17G
17 POWDER, FOR SOLUTION ORAL DAILY PRN
Status: DISCONTINUED | OUTPATIENT
Start: 2024-04-20 | End: 2024-04-23 | Stop reason: HOSPADM

## 2024-04-20 RX ORDER — FAMOTIDINE 20 MG/1
20 TABLET, FILM COATED ORAL 2 TIMES DAILY
Status: DISCONTINUED | OUTPATIENT
Start: 2024-04-20 | End: 2024-04-23 | Stop reason: HOSPADM

## 2024-04-20 RX ORDER — ONDANSETRON 2 MG/ML
4 INJECTION INTRAMUSCULAR; INTRAVENOUS EVERY 6 HOURS PRN
Status: DISCONTINUED | OUTPATIENT
Start: 2024-04-20 | End: 2024-04-23 | Stop reason: HOSPADM

## 2024-04-20 RX ORDER — 0.9 % SODIUM CHLORIDE 0.9 %
1000 INTRAVENOUS SOLUTION INTRAVENOUS ONCE
Status: COMPLETED | OUTPATIENT
Start: 2024-04-20 | End: 2024-04-20

## 2024-04-20 RX ORDER — POTASSIUM CHLORIDE 7.45 MG/ML
10 INJECTION INTRAVENOUS PRN
Status: DISCONTINUED | OUTPATIENT
Start: 2024-04-20 | End: 2024-04-23 | Stop reason: HOSPADM

## 2024-04-20 RX ORDER — ATORVASTATIN CALCIUM 40 MG/1
40 TABLET, FILM COATED ORAL DAILY
Status: DISCONTINUED | OUTPATIENT
Start: 2024-04-20 | End: 2024-04-23 | Stop reason: HOSPADM

## 2024-04-20 RX ADMIN — POTASSIUM BICARBONATE 20 MEQ: 782 TABLET, EFFERVESCENT ORAL at 12:32

## 2024-04-20 RX ADMIN — SODIUM CHLORIDE 1000 ML: 9 INJECTION, SOLUTION INTRAVENOUS at 12:28

## 2024-04-20 RX ADMIN — KETOROLAC TROMETHAMINE 15 MG: 15 INJECTION, SOLUTION INTRAMUSCULAR; INTRAVENOUS at 12:41

## 2024-04-20 RX ADMIN — ACETAMINOPHEN 650 MG: 325 TABLET ORAL at 19:47

## 2024-04-20 RX ADMIN — FAMOTIDINE 20 MG: 20 TABLET, FILM COATED ORAL at 19:47

## 2024-04-20 RX ADMIN — WATER 1000 MG: 1 INJECTION INTRAMUSCULAR; INTRAVENOUS; SUBCUTANEOUS at 12:32

## 2024-04-20 RX ADMIN — SODIUM CHLORIDE: 9 INJECTION, SOLUTION INTRAVENOUS at 17:09

## 2024-04-20 RX ADMIN — SODIUM CHLORIDE, PRESERVATIVE FREE 10 ML: 5 INJECTION INTRAVENOUS at 19:49

## 2024-04-20 ASSESSMENT — LIFESTYLE VARIABLES
HOW OFTEN DO YOU HAVE A DRINK CONTAINING ALCOHOL: NEVER
HOW MANY STANDARD DRINKS CONTAINING ALCOHOL DO YOU HAVE ON A TYPICAL DAY: PATIENT DOES NOT DRINK

## 2024-04-20 ASSESSMENT — PAIN DESCRIPTION - PAIN TYPE
TYPE: ACUTE PAIN;SURGICAL PAIN
TYPE: SURGICAL PAIN

## 2024-04-20 ASSESSMENT — PAIN SCALES - GENERAL
PAINLEVEL_OUTOF10: 5
PAINLEVEL_OUTOF10: 3
PAINLEVEL_OUTOF10: 4
PAINLEVEL_OUTOF10: 6

## 2024-04-20 ASSESSMENT — PAIN DESCRIPTION - ORIENTATION
ORIENTATION: LOWER

## 2024-04-20 ASSESSMENT — PAIN DESCRIPTION - DESCRIPTORS
DESCRIPTORS: ACHING
DESCRIPTORS: ACHING

## 2024-04-20 ASSESSMENT — PAIN DESCRIPTION - LOCATION
LOCATION: BACK

## 2024-04-20 ASSESSMENT — PAIN - FUNCTIONAL ASSESSMENT
PAIN_FUNCTIONAL_ASSESSMENT: ACTIVITIES ARE NOT PREVENTED
PAIN_FUNCTIONAL_ASSESSMENT: PREVENTS OR INTERFERES SOME ACTIVE ACTIVITIES AND ADLS

## 2024-04-20 NOTE — PROGRESS NOTES
Patient transferred from ED via stretcher at this time, patient has family at bedside. Patient resting in bed with call light in reach, side rails up x2, bed alarm on. Patient orders released and reviewed, patient oriented to room.

## 2024-04-20 NOTE — ED PROVIDER NOTES
[facility] ED  eMERGENCY dEPARTMENT eNCOUnter      Pt Name: Jeni Engel  MRN: [id number]  Birthdate 1945  Date of evaluation: 4/20/2024  Provider: NELSON Frankel CNP    CHIEF COMPLAINT       Chief Complaint   Patient presents with    Altered Mental Status     Pt had surgery on Monday, Family states since Thurs Pt has been increasingly confused. Freq urination.     Urinary Tract Infection         HISTORY OF PRESENT ILLNESS  (Location/Symptom, Timing/Onset, Context/Setting, Quality, Duration, Modifying Factors, Severity.)   Jeni Engel is a 79 y.o. female who presents to the emergency department accompanied by her adult daughter reports the patient has been increasingly confused for the last 2 to 3 days.  Patient will have times where she seems very lucid, alert and oriented however she was up to the bathroom many times last night.  Patient s/p lumbar surgery on Monday, did have some acute urinary retention postoperatively in the hospital, patient started states she had a normal bowel movement yesterday evening.  Patient has not taken a Norco for her pain since Thursday due to concern for confusion, has been taking Tylenol however reports her pain is poorly controlled with Tylenol.  No fever or chills, chest pain or shortness of breath, vomiting or diarrhea.  Patient speaking in clear and complete sentences, oriented to self, place and date.  Unable to tell me what symptoms she has been having postoperatively.      Nursing Notes were reviewed.    ALLERGIES     Patient has no known allergies.    CURRENT MEDICATIONS       Previous Medications    ACETAMINOPHEN (TYLENOL) 500 MG TABLET    Take 2 tablets by mouth every 6 hours as needed for Pain    AMLODIPINE-BENAZEPRIL (LOTREL) 5-10 MG PER CAPSULE    Take 1 capsule by mouth every evening    ATORVASTATIN (LIPITOR) 40 MG TABLET    Take 1 tablet by mouth daily    BUPROPION (WELLBUTRIN XL) 300 MG EXTENDED RELEASE TABLET    Take 1 tablet by  components:    Lactic Acid 2.5 (*)     All other components within normal limits   TROPONIN - Abnormal; Notable for the following components:    Troponin, High Sensitivity 32 (*)     All other components within normal limits   COMPREHENSIVE METABOLIC PANEL W/ REFLEX TO MG FOR LOW K - Abnormal; Notable for the following components:    Potassium 3.6 (*)     Glucose 165 (*)     Total Protein 6.2 (*)     Albumin 3.0 (*)     Total Bilirubin 0.2 (*)     Alkaline Phosphatase 115 (*)     All other components within normal limits   TROPONIN - Abnormal; Notable for the following components:    Troponin, High Sensitivity 39 (*)     All other components within normal limits   LACTATE, SEPSIS - Abnormal; Notable for the following components:    Lactic Acid, Sepsis 2.1 (*)     All other components within normal limits   CULTURE, URINE   CULTURE, BLOOD 1   CULTURE, BLOOD 2   LIPASE   TSH WITH REFLEX       All other labs were within normal range or not returned as of this dictation.    EMERGENCY DEPARTMENT COURSE and DIFFERENTIAL DIAGNOSIS/MDM:   Vitals:    Vitals:    04/20/24 0959   BP: 132/60   Pulse: 76   Resp: 18   Temp: 98.4 °F (36.9 °C)   TempSrc: Oral   SpO2: 93%       CLINICAL DECISION MAKING:  The patient presented alert with a nontoxic appearance and was seen in conjunction with Dr. Cui.   Patient presents emergency department with complaint described above.  Patient is alert and oriented to self, place and date.  Disoriented to situation intermittently.  No dysarthria, facial asymmetry or focal deficits.  Patient with equal hand grasp bilaterally, equal dorsi and plantarflexion bilaterally.  Equal radial pulses, equal DP and PT pulses.  Patient able to extend arms and legs without drift.  She endorses intact sensation to bilateral upper and lower extremities.  Patient's incision to the lumbar spine is intact, bandage without strikethrough.  No surrounding erythema or streaking.  Patient is afebrile, not tachycardic

## 2024-04-20 NOTE — ED PROVIDER NOTES
EMERGENCY DEPARTMENT ENCOUNTER   ATTENDING ATTESTATION     Pt Name: Jeni Engel  MRN: [id number]  Birthdate 1945  Date of evaluation: 4/20/24   Jeni Egnel is a 79 y.o. female with CC: Altered Mental Status (Pt had surgery on Monday, Family states since Thurs Pt has been increasingly confused. Freq urination. ) and Urinary Tract Infection    MDM:   I performed a substantive part of the MDM during the patient's E/M visit. I personally evaluated and examined the patient. I personally made or approved the documented management plan and acknowledge its risk of complications.      ED Course as of 04/20/24 1505   Sat Apr 20, 2024   1035 Bladder scan reveals 470 mL in urinary bladder. Will place indwelling zavala catheter and send urine for culture. [AJ]   1131 CT CHEST ABDOMEN PELVIS WO CONTRAST Additional Contrast? None [AJ]   1131 CT LUMBAR SPINE BONY RECONSTRUCTION [AJ]   1216 CBC with Auto Differential(!):    WBC 7.5   RBC 5.01   Hemoglobin Quant 15.0   Hematocrit 47.6(!)   MCV 95.0   MCH 29.9   MCHC 31.5   RDW 13.2   Platelet Count 236   MPV 9.7   NRBC Automated 0.0   Neutrophils/100 leukocytes 79(!)   Lymphocyte % 10(!)   Monocytes % 9   Eosinophils % 2   Basophils % 1   Immature Granulocytes % 1(!)   Neutrophils Absolute 5.87   Lymphocytes Absolute 0.72(!)   Monocytes Absolute 0.69   Eosinophils Absolute 0.12   Basophils Absolute 0.04   Immature Granulocytes Absolute 0.04 [AJ]   1216 Lipase:    Lipase 37 [AJ]   1216 Troponin(!):    Troponin, High Sensitivity 32(!)  Repeat troponin ordered. [AJ]   1216 Potassium(!): 3.6  Effer K ordered.  [AJ]   1216 Lactic Acid(!): 2.5 [AJ]   1302 Lactic Acid, Sepsis(!): 2.1 [AJ]   1331 [facility]. [AJ]      ED Course User Index  [AJ] Elif Cárdenas, APRN - CNP       CRITICAL CARE:       EKG: All EKG's are interpreted by the Emergency Department Physician who either signs or Co-signs this chart in the  Never   • Smokeless tobacco: Never   Vaping Use   • Vaping Use: Never used   Substance Use Topics   • Alcohol use: Never   • Drug use: Never          Karmen Cui MD  Attending Emergency Physician          Karmen Cui MD  04/20/24 6266

## 2024-04-20 NOTE — ED NOTES
ED to inpatient nurses report     Chief Complaint   Patient presents with    Altered Mental Status     Pt had surgery on Monday, Family states since Thurs Pt has been increasingly confused. Freq urination.     Urinary Tract Infection      Present to ED from home  LOC: alert and orientated to name, place, date - does display intermittent confusion at times.   Vital signs   Vitals:    04/20/24 0959 04/20/24 1356 04/20/24 1400   BP: 132/60 (!) 143/77 123/72   Pulse: 76     Resp: 18     Temp: 98.4 °F (36.9 °C)     TempSrc: Oral     SpO2: 93%        Oxygen Baseline On room air    Current needs required - Needs met at this time, zavala catheter in place.    SEPSIS: N/A  [Yes] Lactate X 2 ordered (Yes or No)  [Yes] Antibiotics given (Yes or No)  [No] IV Fluids ordered (Yes or No)             [No] 2nd IV completed (Yes or No)  [N/A] Hourly Vital Signs (Validated)  [No] Outstanding Orders:     LDAs:   Peripheral IV 04/20/24 Right Antecubital (Active)     Mobility: Requires assistance * 1  Fall Risk:    Pending ED orders: None at this time  Present condition: Stable  Code Status: Full  Consults: IP CONSULT TO INTERNAL MEDICINE  [x]  Hospitalist  Completed  [x] yes [] no Who: Dr. Rutherford  []  Medicine  Completed  [] yes [] No Who:   []  Cardiology  Completed  [] yes [] No Who:   []  GI   Completed  [] yes [] No Who:   []  Neurology  Completed  [] yes [] No Who:   []  Nephrology Completed  [] yes [] No Who:    []  Vascular  Completed  [] yes [] No Who:   []  Ortho  Completed  [] yes [] No Who:     []  Surgery  Completed  [] yes [] No Who:    []  Urology  Completed  [] yes [] No Who:    []  CT Surgery Completed  [] yes [] No Who:   []  Podiatry  Completed  [] yes [] No Who:    []  Other    Completed  [] yes [] No Who:  Interventions: None  Important Events: None        Electronically signed by Bessie Morales RN on 4/20/2024 at 2:30 PM

## 2024-04-20 NOTE — H&P
Interval 420 ms    QTc Calculation (Bazett) 462 ms    P Axis 28 degrees    R Axis -36 degrees    T Axis 39 degrees   Culture, Blood 2    Collection Time: 04/20/24 11:15 AM    Specimen: Blood   Result Value Ref Range    Specimen Description .BLOOD     Special Requests RT AC UNK ML     Culture NO GROWTH <24 HRS    Blood Culture 1    Collection Time: 04/20/24 11:20 AM    Specimen: Blood   Result Value Ref Range    Specimen Description .BLOOD     Special Requests RT HAND UNK ML     Culture NO GROWTH <24 HRS    Urinalysis with Microscopic    Collection Time: 04/20/24 11:30 AM   Result Value Ref Range    Color, UA Yellow Yellow    Turbidity UA Clear Clear    Glucose, Ur 3+ (A) NEGATIVE mg/dL    Bilirubin Urine NEGATIVE NEGATIVE    Ketones, Urine NEGATIVE NEGATIVE mg/dL    Specific Gravity, UA 1.020 1.005 - 1.030    Urine Hgb NEGATIVE NEGATIVE    pH, UA 6.5 5.0 - 8.0    Protein, UA NEGATIVE NEGATIVE mg/dL    Urobilinogen, Urine Normal 0.0 - 1.0 EU/dL    Nitrite, Urine NEGATIVE NEGATIVE    Leukocyte Esterase, Urine NEGATIVE NEGATIVE    WBC, UA 2 TO 5 0 - 5 /HPF    RBC, UA 0 TO 2 0 - 2 /HPF    Epithelial Cells UA 0 TO 2 0 - 5 /HPF    Amorphous, UA 1+ (A) None   CBC with Auto Differential    Collection Time: 04/20/24 11:30 AM   Result Value Ref Range    WBC 7.5 3.5 - 11.3 k/uL    RBC 5.01 3.95 - 5.11 m/uL    Hemoglobin 15.0 11.9 - 15.1 g/dL    Hematocrit 47.6 (H) 36.3 - 47.1 %    MCV 95.0 82.6 - 102.9 fL    MCH 29.9 25.2 - 33.5 pg    MCHC 31.5 28.4 - 34.8 g/dL    RDW 13.2 11.8 - 14.4 %    Platelets 236 138 - 453 k/uL    MPV 9.7 8.1 - 13.5 fL    NRBC Automated 0.0 0.0 per 100 WBC    Neutrophils % 79 (H) 36 - 65 %    Lymphocytes % 10 (L) 24 - 43 %    Monocytes % 9 3 - 12 %    Eosinophils % 2 1 - 4 %    Basophils % 1 0 - 2 %    Immature Granulocytes % 1 (H) 0 %    Neutrophils Absolute 5.87 1.50 - 8.10 k/uL    Lymphocytes Absolute 0.72 (L) 1.10 - 3.70 k/uL    Monocytes Absolute 0.69 0.10 - 1.20 k/uL    Eosinophils Absolute 0.12  diverticulosis with no evidence of diverticulitis. 3. Right renal pelvicaliectasis without danis hydronephrosis.  No identifiable cause. 4. Small focus of gas in the urinary bladder.  Please correlate with recent instrumentation which may account for this finding. 5. Cholecystectomy. 6. Hysterectomy. CT Lumbar Spine: 1. L4 and L5 posterior decompression.  Small foci of gas in the soft tissue is likely related to recent surgery.  Within the limitations of a noncontrast exam, there is no formed fluid collection or abscess.  If clinical concern persists, exam with contrast would be more sensitive.     CT LUMBAR SPINE BONY RECONSTRUCTION    Result Date: 4/20/2024  CT Chest: 1. No acute intrathoracic abnormality. 2. Elevation of the right hemidiaphragm with no identifiable cause. 3. 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  Recommend thyroid ultrasound on a nonemergent basis. CT Abdomen/Pelvis: 1. No acute abnormality in the abdomen or pelvis. 2. Sigmoid diverticulosis with no evidence of diverticulitis. 3. Right renal pelvicaliectasis without danis hydronephrosis.  No identifiable cause. 4. Small focus of gas in the urinary bladder.  Please correlate with recent instrumentation which may account for this finding. 5. Cholecystectomy. 6. Hysterectomy. CT Lumbar Spine: 1. L4 and L5 posterior decompression.  Small foci of gas in the soft tissue is likely related to recent surgery.  Within the limitations of a noncontrast exam, there is no formed fluid collection or abscess.  If clinical concern persists, exam with contrast would be more sensitive.     CT HEAD WO CONTRAST    Result Date: 4/20/2024  Chronic microvascular disease without acute intracranial abnormality. Chronic sinusitis involving the left maxillary sinus.       Assessment :      Hospital Problems             Last Modified POA    * (Principal) Delirium 4/20/2024 Yes    Hypothyroidism 4/20/2024 Yes    Type

## 2024-04-20 NOTE — PLAN OF CARE
Patient transferred from ED, patient has zavala in place, patent and draining. Patient started on IV fluids. Patient post op dressing was cleansed with CHG soap, and new island dressing was applied.   Problem: Discharge Planning  Goal: Discharge to home or other facility with appropriate resources  4/20/2024 2001 by Jennifer Vega, RN  Outcome: Progressing     Problem: Pain  Goal: Verbalizes/displays adequate comfort level or baseline comfort level  4/20/2024 2001 by Jennifer Vega, RN  Outcome: Progressing     Problem: Safety - Adult  Goal: Free from fall injury  4/20/2024 1958 by Jennifer Vega, RN  Outcome: Progressing

## 2024-04-20 NOTE — PROGRESS NOTES
Transitions of Care Pharmacy Service   Medication Review    The patient's list of current home medications has been reviewed.     Source(s) of information: Patient's daughter, Kaci    Based on information provided by the above source(s), I have updated the patient's home med list.     Other Notes Patient recently had a laminectomy and was prescribed Robaxin which she only took for the first 24hrs after procedure. She has been taking Norco which daughter thinks may have contributed to the confusion so [place] was held all day yesterday 4/19. One dose was given this AM 4/20 as daughter reports patient was in more pain. She takes Tylenol 1000mg prn for pain when not using Norco. Daughter also reports patient is constipated which could contribute to confusion as well. She was prescribed Senna BID but daughter reports patient is not 100% compliant with all meds as she lives alone.  Patient was taking Provigil prior to procedure but daughter states she didn't need it after the procedure, and PCP agreed so she hasn't received since Mon 4/15  Farxiga 10mg daily is a new med for patient and was first filled 4/11/24         Please feel free to call me with any questions about this encounter. Thank you.    Dana Miguel RPH   Transitions of Care Pharmacy Service  Phone:  [phone]  Fax: [phone]      Electronically signed by Dana Miguel RPH on 4/20/2024 at 11:54 AM     No current facility-administered medications on file prior to encounter.     Current Outpatient Medications on File Prior to Encounter   Medication Sig Dispense Refill    acetaminophen (TYLENOL) 500 MG tablet Take 2 tablets by mouth every 6 hours as needed for Pain      HYDROcodone-acetaminophen (NORCO) 5-325 MG per tablet Take 1-2 tablets by mouth every 4 hours as needed for Pain for up to 7 days. Max Daily Amount: 12 tablets 30 tablet 0    sennosides-docusate sodium (SENOKOT-S) 8.6-50 MG tablet Take 1 tablet by mouth 2 times daily 60 tablet 0

## 2024-04-21 ENCOUNTER — APPOINTMENT (OUTPATIENT)
Dept: GENERAL RADIOLOGY | Age: 79
DRG: 696 | End: 2024-04-21
Payer: MEDICARE

## 2024-04-21 LAB
AMMONIA PLAS-SCNC: 31 UMOL/L (ref 11–51)
ANION GAP SERPL CALCULATED.3IONS-SCNC: 11 MMOL/L (ref 9–17)
ANION GAP SERPL CALCULATED.3IONS-SCNC: 12 MMOL/L (ref 9–17)
ANION GAP SERPL CALCULATED.3IONS-SCNC: 13 MMOL/L (ref 9–17)
BUN SERPL-MCNC: 10 MG/DL (ref 8–23)
BUN SERPL-MCNC: 11 MG/DL (ref 8–23)
BUN SERPL-MCNC: 9 MG/DL (ref 8–23)
BUN/CREAT SERPL: 11 (ref 9–20)
BUN/CREAT SERPL: 13 (ref 9–20)
BUN/CREAT SERPL: 14 (ref 9–20)
CALCIUM SERPL-MCNC: 8.8 MG/DL (ref 8.6–10.4)
CALCIUM SERPL-MCNC: 9.1 MG/DL (ref 8.6–10.4)
CALCIUM SERPL-MCNC: 9.3 MG/DL (ref 8.6–10.4)
CHLORIDE SERPL-SCNC: 100 MMOL/L (ref 98–107)
CHLORIDE SERPL-SCNC: 102 MMOL/L (ref 98–107)
CHLORIDE SERPL-SCNC: 103 MMOL/L (ref 98–107)
CO2 SERPL-SCNC: 24 MMOL/L (ref 20–31)
CREAT SERPL-MCNC: 0.8 MG/DL (ref 0.5–0.9)
GFR SERPL CREATININE-BSD FRML MDRD: 76 ML/MIN/1.73M2
GLUCOSE BLD-MCNC: 137 MG/DL (ref 65–105)
GLUCOSE BLD-MCNC: 146 MG/DL (ref 65–105)
GLUCOSE BLD-MCNC: 188 MG/DL (ref 65–105)
GLUCOSE BLD-MCNC: 96 MG/DL (ref 65–105)
GLUCOSE SERPL-MCNC: 137 MG/DL (ref 70–99)
GLUCOSE SERPL-MCNC: 167 MG/DL (ref 70–99)
GLUCOSE SERPL-MCNC: 177 MG/DL (ref 70–99)
INR PPP: 1
LACTATE BLDV-SCNC: 1.2 MMOL/L (ref 0.5–2.2)
MAGNESIUM SERPL-MCNC: 1.9 MG/DL (ref 1.6–2.6)
MAGNESIUM SERPL-MCNC: 1.9 MG/DL (ref 1.6–2.6)
MICROORGANISM SPEC CULT: NO GROWTH
POTASSIUM SERPL-SCNC: 3.1 MMOL/L (ref 3.7–5.3)
POTASSIUM SERPL-SCNC: 3.5 MMOL/L (ref 3.7–5.3)
POTASSIUM SERPL-SCNC: 3.8 MMOL/L (ref 3.7–5.3)
PROCALCITONIN SERPL-MCNC: 0.15 NG/ML (ref 0–0.09)
PROTHROMBIN TIME: 13.6 SEC (ref 11.5–14.2)
SODIUM SERPL-SCNC: 137 MMOL/L (ref 135–144)
SODIUM SERPL-SCNC: 137 MMOL/L (ref 135–144)
SODIUM SERPL-SCNC: 139 MMOL/L (ref 135–144)
SPECIMEN DESCRIPTION: NORMAL

## 2024-04-21 PROCEDURE — 97535 SELF CARE MNGMENT TRAINING: CPT

## 2024-04-21 PROCEDURE — 84145 PROCALCITONIN (PCT): CPT

## 2024-04-21 PROCEDURE — 99232 SBSQ HOSP IP/OBS MODERATE 35: CPT | Performed by: NURSE PRACTITIONER

## 2024-04-21 PROCEDURE — 6370000000 HC RX 637 (ALT 250 FOR IP): Performed by: NURSE PRACTITIONER

## 2024-04-21 PROCEDURE — 82947 ASSAY GLUCOSE BLOOD QUANT: CPT

## 2024-04-21 PROCEDURE — 6370000000 HC RX 637 (ALT 250 FOR IP): Performed by: FAMILY MEDICINE

## 2024-04-21 PROCEDURE — 71045 X-RAY EXAM CHEST 1 VIEW: CPT

## 2024-04-21 PROCEDURE — 85610 PROTHROMBIN TIME: CPT

## 2024-04-21 PROCEDURE — 83735 ASSAY OF MAGNESIUM: CPT

## 2024-04-21 PROCEDURE — 97530 THERAPEUTIC ACTIVITIES: CPT

## 2024-04-21 PROCEDURE — 1200000000 HC SEMI PRIVATE

## 2024-04-21 PROCEDURE — 97166 OT EVAL MOD COMPLEX 45 MIN: CPT

## 2024-04-21 PROCEDURE — 83605 ASSAY OF LACTIC ACID: CPT

## 2024-04-21 PROCEDURE — 82140 ASSAY OF AMMONIA: CPT

## 2024-04-21 PROCEDURE — 99222 1ST HOSP IP/OBS MODERATE 55: CPT | Performed by: PSYCHIATRY & NEUROLOGY

## 2024-04-21 PROCEDURE — 80048 BASIC METABOLIC PNL TOTAL CA: CPT

## 2024-04-21 PROCEDURE — 2580000003 HC RX 258: Performed by: NURSE PRACTITIONER

## 2024-04-21 PROCEDURE — 36415 COLL VENOUS BLD VENIPUNCTURE: CPT

## 2024-04-21 RX ORDER — HYDROXYZINE HYDROCHLORIDE 10 MG/1
10 TABLET, FILM COATED ORAL ONCE
Status: COMPLETED | OUTPATIENT
Start: 2024-04-21 | End: 2024-04-21

## 2024-04-21 RX ADMIN — FAMOTIDINE 20 MG: 20 TABLET, FILM COATED ORAL at 08:25

## 2024-04-21 RX ADMIN — HYDROXYZINE HYDROCHLORIDE 10 MG: 10 TABLET ORAL at 03:32

## 2024-04-21 RX ADMIN — POTASSIUM CHLORIDE 40 MEQ: 1500 TABLET, EXTENDED RELEASE ORAL at 08:24

## 2024-04-21 RX ADMIN — BUPROPION HYDROCHLORIDE 300 MG: 150 TABLET, EXTENDED RELEASE ORAL at 08:24

## 2024-04-21 RX ADMIN — ATORVASTATIN CALCIUM 40 MG: 40 TABLET, FILM COATED ORAL at 08:25

## 2024-04-21 RX ADMIN — LISINOPRIL 10 MG: 10 TABLET ORAL at 08:25

## 2024-04-21 RX ADMIN — BUSPIRONE HYDROCHLORIDE 5 MG: 5 TABLET ORAL at 08:24

## 2024-04-21 RX ADMIN — PHENOL 1 SPRAY: 1.5 LIQUID ORAL at 03:32

## 2024-04-21 RX ADMIN — AMLODIPINE BESYLATE 5 MG: 5 TABLET ORAL at 08:25

## 2024-04-21 RX ADMIN — METOPROLOL 100 MG: 100 TABLET ORAL at 08:24

## 2024-04-21 RX ADMIN — FAMOTIDINE 20 MG: 20 TABLET, FILM COATED ORAL at 20:37

## 2024-04-21 RX ADMIN — SODIUM CHLORIDE, PRESERVATIVE FREE 10 ML: 5 INJECTION INTRAVENOUS at 20:59

## 2024-04-21 ASSESSMENT — PAIN DESCRIPTION - ORIENTATION
ORIENTATION: LOWER
ORIENTATION: LOWER
ORIENTATION: POSTERIOR

## 2024-04-21 ASSESSMENT — PAIN DESCRIPTION - PAIN TYPE
TYPE: ACUTE PAIN;SURGICAL PAIN

## 2024-04-21 ASSESSMENT — PAIN DESCRIPTION - LOCATION
LOCATION: OTHER (COMMENT);BACK
LOCATION: BACK
LOCATION: BACK

## 2024-04-21 ASSESSMENT — PAIN - FUNCTIONAL ASSESSMENT
PAIN_FUNCTIONAL_ASSESSMENT: PREVENTS OR INTERFERES SOME ACTIVE ACTIVITIES AND ADLS

## 2024-04-21 ASSESSMENT — PAIN SCALES - GENERAL
PAINLEVEL_OUTOF10: 2
PAINLEVEL_OUTOF10: 6
PAINLEVEL_OUTOF10: 3

## 2024-04-21 ASSESSMENT — PAIN DESCRIPTION - DESCRIPTORS
DESCRIPTORS: ACHING

## 2024-04-21 NOTE — PROGRESS NOTES
Occupational Therapy  Facility/Department: [facility] MED SURG  Occupational Therapy Initial Assessment    Name: Jeni Engel  : 1945  MRN: [id number]  Date of Service: 2024    RN Ashley/Jennifer reports patient is medically stable for therapy treatment this date.    Chart reviewed prior to treatment and patient is agreeable for therapy.  All lines intact and patient positioned comfortably at end of treatment.  All patient needs addressed prior to ending therapy session.      Discharge Recommendations:  Patient would benefit from continued therapy after discharge  Pt currently functioning below baseline.  Recommend daily inpatient skilled therapy at time of discharge to maximize long term outcomes and prevent re-admission. Please refer to AM-PAC score for current level of function.    OT Equipment Recommendations  Equipment Needed:  (CTA)       Patient Diagnosis(es): The primary encounter diagnosis was Acute urinary retention. Diagnoses of Chronic maxillary sinusitis, Elevated troponin, and Altered mental status, unspecified altered mental status type were also pertinent to this visit.  Past Medical History:  has a past medical history of Acid reflux disease, Arthritis, Chronic cholecystitis, Chronic kidney disease, Depression, Diabetes mellitus (HCC), Dizziness, Falls frequently, Glaucoma, Hard of hearing, Hx of bilateral cataract extraction, Hyperlipidemia, Hypertension, Multiple thyroid nodules, PMR (polymyalgia rheumatica) (HCC), Sleep apnea, Vitamin D deficiency, and Wears glasses.  Past Surgical History:  has a past surgical history that includes eye surgery (Bilateral); Hysterectomy (); Nephrostomy (); Ureter stent placement (Right, ); Knee arthroscopy (Right); Finger trigger release; Colonoscopy; Cervical spine surgery; Cholecystectomy, laparoscopic (N/A, 03/10/2020); Shoulder Arthroplasty (Right); and laminectomy (N/A, 4/15/2024).     PER H&P: Jeni Engel is a 79 y.o. Non- /

## 2024-04-21 NOTE — PLAN OF CARE
Problem: Discharge Planning  Goal: Discharge to home or other facility with appropriate resources  4/21/2024 0157 by Roseanne Stewart RN  Outcome: Progressing  Flowsheets (Taken 4/20/2024 1631 by Jennifer Vega RN)  Discharge to home or other facility with appropriate resources:   Identify barriers to discharge with patient and caregiver   Identify discharge learning needs (meds, wound care, etc)   Refer to discharge planning if patient needs post-hospital services based on physician order or complex needs related to functional status, cognitive ability or social support system   Arrange for needed discharge resources and transportation as appropriate   Arrange for interpreters to assist at discharge as needed  4/20/2024 2001 by Jennifer Vega RN  Outcome: Progressing  4/20/2024 1958 by Jennifer Vega RN  Outcome: Progressing  Flowsheets  Taken 4/20/2024 1631  Discharge to home or other facility with appropriate resources:   Identify barriers to discharge with patient and caregiver   Identify discharge learning needs (meds, wound care, etc)   Refer to discharge planning if patient needs post-hospital services based on physician order or complex needs related to functional status, cognitive ability or social support system   Arrange for needed discharge resources and transportation as appropriate   Arrange for interpreters to assist at discharge as needed  Taken 4/20/2024 1500  Discharge to home or other facility with appropriate resources: Identify barriers to discharge with patient and caregiver     Problem: Pain  Goal: Verbalizes/displays adequate comfort level or baseline comfort level  4/21/2024 0157 by Roseanne Stewart, RN  Outcome: Progressing  Flowsheets (Taken 4/20/2024 1617 by Jennifer Vega RN)  Verbalizes/displays adequate comfort level or baseline comfort level:   Assess pain using appropriate pain scale   Implement non-pharmacological measures as appropriate and evaluate  within normal limits:   Monitor labs and assess patient for signs and symptoms of electrolyte imbalances   Monitor response to electrolyte replacements, including repeat lab results as appropriate   Instruct patient on fluid and nutrition restrictions as appropriate   Administer electrolyte replacement as ordered

## 2024-04-21 NOTE — CARE COORDINATION
Case Management Assessment  Initial Evaluation    Date/Time of Evaluation: 4/21/2024 12:45 PM  Assessment Completed by: Mariangel Molina RN    If patient is discharged prior to next notation, then this note serves as note for discharge by case management.    Patient Name: Jeni Engel                   YOB: 1945  Diagnosis: Chronic maxillary sinusitis [J32.0]  Delirium [R41.0]  Elevated troponin [R79.89]  Acute urinary retention [R33.8]  Altered mental status, unspecified altered mental status type [R41.82]                   Date / Time: 4/20/2024 10:08 AM    Patient Admission Status: Inpatient   Readmission Risk (Low < 19, Mod (19-27), High > 27): No data recorded  Current PCP: Anibal Bartlett MD  PCP verified by CM?      Chart Reviewed: Yes      History Provided by: Child/Family (Daughter- Geovanna Mccabe)  Patient Orientation: Other (see comment) (confused)    Patient Cognition: Severely Impaired    Hospitalization in the last 30 days (Readmission):  Yes    If yes, Readmission Assessment in  Navigator will be completed.    Advance Directives:      Code Status: Full Code   Patient's Primary Decision Maker is: Legal Next of Kin      Discharge Planning:    Patient lives with: Alone Type of Home: House  Primary Care Giver:    Patient Support Systems include: Home Care Staff, Children   Current Financial resources:    Current community resources:    Current services prior to admission: [facility]            Current DME: Walker, Wheelchair, Shower Chair            Type of Home Care services:  Nursing Services    ADLS  Prior functional level:    Current functional level:      PT AM-PAC:   /24  OT AM-PAC:   /24    Family can provide assistance at DC:    Would you like Case Management to discuss the discharge plan with any other family members/significant others, and if so, who?    Plans to Return to Present Housing:    Other Identified Issues/Barriers to RETURNING to current housing:

## 2024-04-21 NOTE — CONSULTS
Decreased to touch, pin, vibration, proprioception throughout     Cerebellar Intact finger-nose-finger testing.     Reflex function 1/4 symmetric throughout . Downgoing plantar response bilaterally. (-)Franklin's sign bilaterally    Gait                  Not safely tested              Diagnostics:      Laboratory Testing:  CBC:   Recent Labs     04/20/24  1130   WBC 7.5   HGB 15.0        BMP:    Recent Labs     04/20/24  1130 04/21/24  0523    139   K 3.6* 3.1*    103   CO2 22 24   BUN 14 9   CREATININE 0.9 0.8   GLUCOSE 165* 137*         Lab Results   Component Value Date    ALT 11 04/20/2024    AST 29 04/20/2024    TSH 0.92 04/20/2024    INR 1.0 04/21/2024    LABA1C 9.5 (H) 04/10/2024    MG 1.9 04/21/2024    PHOS 3.1 12/15/2022       No results found for: \"PHENYTOIN\", \"PHENOBARB\", \"VALPROATE\", \"CBMZ\"      Imaging/Diagnostics:        Results for orders placed during the hospital encounter of 04/20/24    CT HEAD WO CONTRAST    Narrative  EXAMINATION:  CT OF THE HEAD WITHOUT CONTRAST  4/20/2024 10:39 am    TECHNIQUE:  CT of the head was performed without the administration of intravenous  contrast. Automated exposure control, iterative reconstruction, and/or weight  based adjustment of the mA/kV was utilized to reduce the radiation dose to as  low as reasonably achievable.    COMPARISON:  None.    HISTORY:  ORDERING SYSTEM PROVIDED HISTORY: AMS  TECHNOLOGIST PROVIDED HISTORY:  AMS    Decision Support Exception - unselect if not a suspected or confirmed  emergency medical condition->Emergency Medical Condition (MA)  Reason for Exam: pt. brought in for extreme confusion; hx. of UTI    FINDINGS:  BRAIN/VENTRICLES: There is no acute intracranial hemorrhage, mass effect, or  midline shift.  There is satisfactory overall gray-white matter  differentiation.  There is chronic microvascular disease.  The ventricular  structures are symmetric and unremarkable.  The infratentorial structures

## 2024-04-21 NOTE — PLAN OF CARE
Problem: Discharge Planning  Goal: Discharge to home or other facility with appropriate resources  Outcome: Progressing  Flowsheets (Taken 4/21/2024 0845)  Discharge to home or other facility with appropriate resources:   Identify barriers to discharge with patient and caregiver   Identify discharge learning needs (meds, wound care, etc)     Problem: Pain  Goal: Verbalizes/displays adequate comfort level or baseline comfort level  Outcome: Progressing     Problem: Respiratory - Adult  Goal: Achieves optimal ventilation and oxygenation  Outcome: Progressing  Flowsheets (Taken 4/21/2024 0845)  Achieves optimal ventilation and oxygenation:   Assess for changes in respiratory status   Assess for changes in mentation and behavior     Problem: Skin/Tissue Integrity - Adult  Goal: Incisions, wounds, or drain sites healing without S/S of infection  Outcome: Progressing  Flowsheets (Taken 4/21/2024 0845)  Incisions, Wounds, or Drain Sites Healing Without Sign and Symptoms of Infection:   ADMISSION and DAILY: Assess and document risk factors for pressure ulcer development   TWICE DAILY: Assess and document skin integrity     Problem: Musculoskeletal - Adult  Goal: Maintain proper alignment of affected body part  Outcome: Progressing  Flowsheets (Taken 4/21/2024 0845)  Maintain proper alignment of affected body part:   Instruct and reinforce with patient and family use of appropriate assistive device and precautions (e.g. spinal or hip dislocation precautions)   Support and protect limb and body alignment per provider's orders

## 2024-04-21 NOTE — PROGRESS NOTES
INR  --  1.0     Chemistry:  Recent Labs     04/20/24  1130 04/20/24  1233 04/21/24  0523     --  139   K 3.6*  --  3.1*     --  103   CO2 22  --  24   GLUCOSE 165*  --  137*   BUN 14  --  9   CREATININE 0.9  --  0.8   MG  --   --  1.9   ANIONGAP 14  --  12   LABGLOM 66  --  76   CALCIUM 9.0  --  9.3   TROPHS 32* 39*  --      Recent Labs     04/20/24  1130 04/20/24  1233 04/20/24  1654 04/20/24 2027 04/21/24  0626 04/21/24  0936   PROT 6.2*  --   --   --   --   --    TSH  --  0.92  --   --   --   --    AST 29  --   --   --   --   --    ALT 11  --   --   --   --   --    ALKPHOS 115*  --   --   --   --   --    BILITOT 0.2*  --   --   --   --   --    AMMONIA  --   --   --   --   --  31   LIPASE 37  --   --   --   --   --    POCGLU  --   --  124* 135* 96  --      ABG:No results found for: \"POCPH\", \"PHART\", \"PH\", \"POCPCO2\", \"HMZ9FXO\", \"PCO2\", \"POCPO2\", \"PO2ART\", \"PO2\", \"POCHCO3\", \"YNC7URQ\", \"HCO3\", \"NBEA\", \"PBEA\", \"BEART\", \"BE\", \"THGBART\", \"THB\", \"DFG2BQZ\", \"QHEM4JHE\", \"U6OFLODK\", \"O2SAT\", \"FIO2\"  Lab Results   Component Value Date/Time    SPECIAL RT HAND UNK ML 04/20/2024 11:20 AM     Lab Results   Component Value Date/Time    CULTURE NO GROWTH 12 HOURS 04/20/2024 11:20 AM       Radiology:  XR CHEST PORTABLE    Result Date: 4/21/2024  Cardiomegaly with right basilar effusion.     CT CHEST ABDOMEN PELVIS WO CONTRAST Additional Contrast? None    Result Date: 4/20/2024  CT Chest: 1. No acute intrathoracic abnormality. 2. Elevation of the right hemidiaphragm with no identifiable cause. 3. 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  Recommend thyroid ultrasound on a nonemergent basis. CT Abdomen/Pelvis: 1. No acute abnormality in the abdomen or pelvis. 2. Sigmoid diverticulosis with no evidence of diverticulitis. 3. Right renal pelvicaliectasis without danis hydronephrosis.  No identifiable cause. 4. Small focus of gas in the urinary bladder.  Please  correlate with recent instrumentation which may account for this finding. 5. Cholecystectomy. 6. Hysterectomy. CT Lumbar Spine: 1. L4 and L5 posterior decompression.  Small foci of gas in the soft tissue is likely related to recent surgery.  Within the limitations of a noncontrast exam, there is no formed fluid collection or abscess.  If clinical concern persists, exam with contrast would be more sensitive.     CT LUMBAR SPINE BONY RECONSTRUCTION    Result Date: 4/20/2024  CT Chest: 1. No acute intrathoracic abnormality. 2. Elevation of the right hemidiaphragm with no identifiable cause. 3. 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  Recommend thyroid ultrasound on a nonemergent basis. CT Abdomen/Pelvis: 1. No acute abnormality in the abdomen or pelvis. 2. Sigmoid diverticulosis with no evidence of diverticulitis. 3. Right renal pelvicaliectasis without danis hydronephrosis.  No identifiable cause. 4. Small focus of gas in the urinary bladder.  Please correlate with recent instrumentation which may account for this finding. 5. Cholecystectomy. 6. Hysterectomy. CT Lumbar Spine: 1. L4 and L5 posterior decompression.  Small foci of gas in the soft tissue is likely related to recent surgery.  Within the limitations of a noncontrast exam, there is no formed fluid collection or abscess.  If clinical concern persists, exam with contrast would be more sensitive.     CT HEAD WO CONTRAST    Result Date: 4/20/2024  Chronic microvascular disease without acute intracranial abnormality. Chronic sinusitis involving the left maxillary sinus.       Physical Examination:        Physical Exam  Constitutional:       General: She is not in acute distress.     Appearance: Normal appearance. She is normal weight. She is not toxic-appearing.   HENT:      Head: Normocephalic and atraumatic.      Mouth/Throat:      Mouth: Mucous membranes are moist.   Eyes:      Extraocular Movements:

## 2024-04-21 NOTE — CARE COORDINATION
04/21/24 1238   Readmission Assessment   Number of Days since last admission? 1-7 days   Previous Disposition Home Alone   Who is being Interviewed Caregiver  (daughter Geovanna)   What was the patient's/caregiver's perception as to why they think they needed to return back to the hospital? Other (Comment)  (AMS)   Did you visit your Primary Care Physician after you left the hospital, before you returned this time? No   Why weren't you able to visit your PCP? Did not have an appointment   Did you see a specialist, such as Cardiac, Pulmonary, Orthopedic Physician, etc. after you left the hospital? No   Who advised the patient to return to the hospital? Self-referral   Does the patient report anything that got in the way of taking their medications? No   In our efforts to provide the best possible care to you and others like you, can you think of anything that we could have done to help you after you left the hospital the first time, so that you might not have needed to return so soon? Arrange for more help when leaving the hospital;Teach back instructions regarding management of illness;Identify patient's health literacy needs

## 2024-04-22 ENCOUNTER — APPOINTMENT (OUTPATIENT)
Dept: MRI IMAGING | Age: 79
DRG: 696 | End: 2024-04-22
Payer: MEDICARE

## 2024-04-22 PROBLEM — R41.82 ALTERED MENTAL STATUS: Status: ACTIVE | Noted: 2024-04-22

## 2024-04-22 PROBLEM — G93.40 ACUTE ENCEPHALOPATHY: Status: ACTIVE | Noted: 2024-04-22

## 2024-04-22 PROBLEM — R79.89 ELEVATED TROPONIN: Status: ACTIVE | Noted: 2024-04-22

## 2024-04-22 PROBLEM — J32.0 CHRONIC MAXILLARY SINUSITIS: Status: ACTIVE | Noted: 2024-04-22

## 2024-04-22 LAB
ANION GAP SERPL CALCULATED.3IONS-SCNC: 16 MMOL/L (ref 9–17)
BUN SERPL-MCNC: 9 MG/DL (ref 8–23)
BUN/CREAT SERPL: 13 (ref 9–20)
CALCIUM SERPL-MCNC: 9.2 MG/DL (ref 8.6–10.4)
CHLORIDE SERPL-SCNC: 101 MMOL/L (ref 98–107)
CO2 SERPL-SCNC: 22 MMOL/L (ref 20–31)
CREAT SERPL-MCNC: 0.7 MG/DL (ref 0.5–0.9)
FOLATE SERPL-MCNC: 14 NG/ML (ref 4.8–24.2)
GFR SERPL CREATININE-BSD FRML MDRD: 89 ML/MIN/1.73M2
GLUCOSE BLD-MCNC: 164 MG/DL (ref 65–105)
GLUCOSE BLD-MCNC: 216 MG/DL (ref 65–105)
GLUCOSE BLD-MCNC: 275 MG/DL (ref 65–105)
GLUCOSE BLD-MCNC: 336 MG/DL (ref 65–105)
GLUCOSE SERPL-MCNC: 163 MG/DL (ref 70–99)
POTASSIUM SERPL-SCNC: 3.6 MMOL/L (ref 3.7–5.3)
SODIUM SERPL-SCNC: 139 MMOL/L (ref 135–144)
VIT B12 SERPL-MCNC: 529 PG/ML (ref 232–1245)

## 2024-04-22 PROCEDURE — 97162 PT EVAL MOD COMPLEX 30 MIN: CPT

## 2024-04-22 PROCEDURE — 99232 SBSQ HOSP IP/OBS MODERATE 35: CPT | Performed by: FAMILY MEDICINE

## 2024-04-22 PROCEDURE — 82947 ASSAY GLUCOSE BLOOD QUANT: CPT

## 2024-04-22 PROCEDURE — 97530 THERAPEUTIC ACTIVITIES: CPT

## 2024-04-22 PROCEDURE — 82746 ASSAY OF FOLIC ACID SERUM: CPT

## 2024-04-22 PROCEDURE — 2580000003 HC RX 258: Performed by: NURSE PRACTITIONER

## 2024-04-22 PROCEDURE — 97535 SELF CARE MNGMENT TRAINING: CPT

## 2024-04-22 PROCEDURE — 80048 BASIC METABOLIC PNL TOTAL CA: CPT

## 2024-04-22 PROCEDURE — 1200000000 HC SEMI PRIVATE

## 2024-04-22 PROCEDURE — 6370000000 HC RX 637 (ALT 250 FOR IP): Performed by: NURSE PRACTITIONER

## 2024-04-22 PROCEDURE — 70551 MRI BRAIN STEM W/O DYE: CPT

## 2024-04-22 PROCEDURE — 97116 GAIT TRAINING THERAPY: CPT

## 2024-04-22 PROCEDURE — 6370000000 HC RX 637 (ALT 250 FOR IP): Performed by: FAMILY MEDICINE

## 2024-04-22 PROCEDURE — 99232 SBSQ HOSP IP/OBS MODERATE 35: CPT | Performed by: PSYCHIATRY & NEUROLOGY

## 2024-04-22 PROCEDURE — 82607 VITAMIN B-12: CPT

## 2024-04-22 PROCEDURE — 36415 COLL VENOUS BLD VENIPUNCTURE: CPT

## 2024-04-22 RX ORDER — TAMSULOSIN HYDROCHLORIDE 0.4 MG/1
0.4 CAPSULE ORAL DAILY
Status: DISCONTINUED | OUTPATIENT
Start: 2024-04-22 | End: 2024-04-23 | Stop reason: HOSPADM

## 2024-04-22 RX ORDER — INSULIN LISPRO 100 [IU]/ML
0-4 INJECTION, SOLUTION INTRAVENOUS; SUBCUTANEOUS
Status: DISCONTINUED | OUTPATIENT
Start: 2024-04-22 | End: 2024-04-23 | Stop reason: HOSPADM

## 2024-04-22 RX ORDER — TAMSULOSIN HYDROCHLORIDE 0.4 MG/1
0.4 CAPSULE ORAL DAILY
Qty: 30 CAPSULE | Refills: 3 | Status: SHIPPED | OUTPATIENT
Start: 2024-04-22

## 2024-04-22 RX ORDER — INSULIN LISPRO 100 [IU]/ML
0-4 INJECTION, SOLUTION INTRAVENOUS; SUBCUTANEOUS NIGHTLY
Status: DISCONTINUED | OUTPATIENT
Start: 2024-04-22 | End: 2024-04-23 | Stop reason: HOSPADM

## 2024-04-22 RX ORDER — LANOLIN ALCOHOL/MO/W.PET/CERES
3 CREAM (GRAM) TOPICAL NIGHTLY PRN
Status: DISCONTINUED | OUTPATIENT
Start: 2024-04-22 | End: 2024-04-23 | Stop reason: HOSPADM

## 2024-04-22 RX ORDER — ESCITALOPRAM OXALATE 10 MG/1
20 TABLET ORAL DAILY
Status: DISCONTINUED | OUTPATIENT
Start: 2024-04-22 | End: 2024-04-23 | Stop reason: HOSPADM

## 2024-04-22 RX ORDER — SENNA AND DOCUSATE SODIUM 50; 8.6 MG/1; MG/1
1 TABLET, FILM COATED ORAL 2 TIMES DAILY
Status: DISCONTINUED | OUTPATIENT
Start: 2024-04-22 | End: 2024-04-23 | Stop reason: HOSPADM

## 2024-04-22 RX ORDER — CALCIUM CARBONATE 500 MG/1
1 TABLET, CHEWABLE ORAL DAILY
Status: DISCONTINUED | OUTPATIENT
Start: 2024-04-22 | End: 2024-04-23 | Stop reason: HOSPADM

## 2024-04-22 RX ADMIN — AMLODIPINE BESYLATE 5 MG: 5 TABLET ORAL at 09:18

## 2024-04-22 RX ADMIN — SODIUM CHLORIDE, PRESERVATIVE FREE 10 ML: 5 INJECTION INTRAVENOUS at 22:43

## 2024-04-22 RX ADMIN — FAMOTIDINE 20 MG: 20 TABLET, FILM COATED ORAL at 09:18

## 2024-04-22 RX ADMIN — Medication 3 MG: at 23:35

## 2024-04-22 RX ADMIN — DOCUSATE SODIUM 50MG AND SENNOSIDES 8.6MG 1 TABLET: 8.6; 5 TABLET, FILM COATED ORAL at 22:42

## 2024-04-22 RX ADMIN — METOPROLOL 100 MG: 100 TABLET ORAL at 09:18

## 2024-04-22 RX ADMIN — LISINOPRIL 10 MG: 10 TABLET ORAL at 09:18

## 2024-04-22 RX ADMIN — BUSPIRONE HYDROCHLORIDE 5 MG: 5 TABLET ORAL at 09:18

## 2024-04-22 RX ADMIN — BUPROPION HYDROCHLORIDE 300 MG: 150 TABLET, EXTENDED RELEASE ORAL at 09:18

## 2024-04-22 RX ADMIN — SODIUM CHLORIDE, PRESERVATIVE FREE 10 ML: 5 INJECTION INTRAVENOUS at 09:19

## 2024-04-22 RX ADMIN — Medication 500 MG: at 16:18

## 2024-04-22 RX ADMIN — ATORVASTATIN CALCIUM 40 MG: 40 TABLET, FILM COATED ORAL at 09:18

## 2024-04-22 RX ADMIN — FAMOTIDINE 20 MG: 20 TABLET, FILM COATED ORAL at 22:42

## 2024-04-22 RX ADMIN — ESCITALOPRAM OXALATE 20 MG: 10 TABLET ORAL at 15:02

## 2024-04-22 RX ADMIN — TAMSULOSIN HYDROCHLORIDE 0.4 MG: 0.4 CAPSULE ORAL at 16:17

## 2024-04-22 RX ADMIN — INSULIN LISPRO 3 UNITS: 100 INJECTION, SOLUTION INTRAVENOUS; SUBCUTANEOUS at 17:50

## 2024-04-22 RX ADMIN — ACETAMINOPHEN 650 MG: 325 TABLET ORAL at 11:02

## 2024-04-22 ASSESSMENT — ENCOUNTER SYMPTOMS
ABDOMINAL PAIN: 0
DIARRHEA: 0
BACK PAIN: 1
WHEEZING: 0
BLOOD IN STOOL: 0
SHORTNESS OF BREATH: 0
COUGH: 0
CONSTIPATION: 0
NAUSEA: 0
RHINORRHEA: 0
CHEST TIGHTNESS: 0
VOMITING: 0

## 2024-04-22 ASSESSMENT — PAIN DESCRIPTION - LOCATION: LOCATION: HEAD

## 2024-04-22 ASSESSMENT — PAIN SCALES - GENERAL
PAINLEVEL_OUTOF10: 1
PAINLEVEL_OUTOF10: 3

## 2024-04-22 ASSESSMENT — PAIN DESCRIPTION - DESCRIPTORS: DESCRIPTORS: ACHING

## 2024-04-22 NOTE — PROGRESS NOTES
Per [facility] approved formulary policy.     SGLT2's are only formulary with the indication of CKD or CHF therefore:    Please note that the  Empagliflozin (Jardiance) is non-formulary with indications of type 2 diabetes and has been discontinued while inpatient. If you feel the patient needs to continue their home therapy during the inpatient stay, the patient may bring their medication bottle for verification and administration pursuant to our home medication use policy.      Please contact the pharmacy with any questions or concerns.  Thank you.  Cam Dan RPH, RPcarlo/PharmD 4/22/2024 2:33 PM

## 2024-04-22 NOTE — CARE COORDINATION
Social Work-[facility] will have bed. Sam is still reviewing. Pt had telesitter dc today. Will need to be without telesitter for 24 hours. Jenna

## 2024-04-22 NOTE — PROGRESS NOTES
Physical Therapy  Facility/Department: [facility] MED SURG  Physical Therapy Initial Assessment    Name: Jeni Engel  : 1945  MRN: [id number]  Date of Service: 2024      Per H&P:79 y.o. Non- / non  female who presents with Altered Mental Status (Pt had surgery on Monday, Family states since Thurs Pt has been increasingly confused. Freq urination. ) and Urinary Tract Infection   and is admitted to the hospital for the management of Delirium.  Patient had a kyphoplasty on Monday and was discharged to home in stable condition with prescriptions for pain medications.  2 days postop patient developed periods of confusion.  Family discontinued her narcotics because they believed these were the cause however her symptoms continued.  She was brought to the emergency department today by family for evaluation.  Neuroassessment is unremarkable aside from her acute delirium.  Patient is also found to have urinary retention.  Daughter reports this is a new complication since her recent hospitalization.  At the time my exam patient is resting with very little complaint except for her postoperative discomfort.  She is alert and oriented.  She advised she has been having difficulty getting around her home and feels that she is experiencing urinary incontinence which is new for her.      RONALD Geller reports patient is medically stable for therapy treatment this date.    Chart reviewed prior to treatment and patient is agreeable for therapy.  All lines intact and patient positioned comfortably at end of treatment.  All patient needs addressed prior to ending therapy session.       Discharge Recommendations:  Patient would benefit from continued therapy after discharge   Pt currently functioning below baseline.  Recommend daily inpatient skilled therapy at time of discharge to maximize long term outcomes and prevent re-admission. Please refer to AM-PAC score for current level of function.   PT Equipment

## 2024-04-22 NOTE — PROGRESS NOTES
End Of Shift Note  [facility]  Summary of shift: Slept through most of shift. Had 1 attempt of getting out of bed, reoriented back to bed.  Good UOP from zavala. Denies pain. Aox4, but confused at times. Plan for MRI brain today    Vitals:    Vitals:    04/21/24 1126 04/21/24 2018 04/22/24 0359 04/22/24 0709   BP: 121/65 133/81  (!) 155/82   Pulse: 78 84  97   Resp: 20 19 16   Temp: 99.1 °F (37.3 °C)   98.2 °F (36.8 °C)   TempSrc: Oral Oral  Oral   SpO2: 93% 96%  95%   Weight:   88.5 kg (195 lb)    Height:            I&O:   Intake/Output Summary (Last 24 hours) at 4/22/2024 0733  Last data filed at 4/22/2024 0358  Gross per 24 hour   Intake 1924.95 ml   Output 3825 ml   Net -1900.05 ml       Resp Status:     Ventilator Settings:     / / /     Critical Care IV infusions:   sodium chloride      [Held by provider] sodium chloride Stopped (04/21/24 0828)    dextrose          LDA:   Peripheral IV 04/21/24 Left;Posterior Hand (Active)   Number of days: 0       Urinary Catheter 04/20/24 Zavala (Active)   Number of days: 1       Incision 04/15/24 Back Lower;Medial (Active)   Number of days: 6

## 2024-04-22 NOTE — CARE COORDINATION
Social Work-[facility] is full. Ricardo Coughlin is OON.                    Post Acute Facility/Agency List     Provided patient with the following list, the list includes the overall star ratings obtained from CMS per the Medicare Web site (www.Medicare.gov):     [] Long Term Acute Care Facilities  [] Acute Inpatient Rehabilitation Facilities  [x] Skilled Nursing Facilities  [] Home Care    Provided verbal instructions on how to utilize the QR Code to obtain additional detailed star ratings from www.Medicare.gov     offered to print and provide the detailed list:    []Accepted   [x]Declined            Daughter would lie a referral to [facility], [facility], and [facility]. Sent referrals. Jenna

## 2024-04-22 NOTE — CARE COORDINATION
Social Work-Left message for [facility] and [facility]. [facility] will [facility] bed for patient. Jenna   16

## 2024-04-22 NOTE — PROGRESS NOTES
Occupational Therapy  Facility/Department: [facility] MED SURG  Rehabilitation Occupational Therapy Daily Treatment Note    Date: 24  Patient Name: Jeni Engel       Room:   MRN: [id number]  Account: [id number]   : 1945  (79 y.o.) Gender: female                    Past Medical History:  has a past medical history of Acid reflux disease, Arthritis, Chronic cholecystitis, Chronic kidney disease, Depression, Diabetes mellitus (HCC), Dizziness, Falls frequently, Glaucoma, Hard of hearing, Hx of bilateral cataract extraction, Hyperlipidemia, Hypertension, Multiple thyroid nodules, PMR (polymyalgia rheumatica) (HCC), Sleep apnea, Vitamin D deficiency, and Wears glasses.  Past Surgical History:   has a past surgical history that includes eye surgery (Bilateral); Hysterectomy (); Nephrostomy (); Ureter stent placement (Right, ); Knee arthroscopy (Right); Finger trigger release; Colonoscopy; Cervical spine surgery; Cholecystectomy, laparoscopic (N/A, 03/10/2020); Shoulder Arthroplasty (Right); and laminectomy (N/A, 4/15/2024).    Restrictions  Restrictions/Precautions: General Precautions, Fall Risk, Up as Tolerated  Other position/activity restrictions: up with assist, Fraire cath, KYPHO 4/15; Telesitter  Required Braces or Orthoses?: No    Subjective  Subjective: Pt in bed upon arrival and agreeable to therapy. RN stated pt was appropriate.  Restrictions/Precautions: General Precautions;Fall Risk;Up as Tolerated             Objective     Cognition  Overall Cognitive Status: Exceptions  Arousal/Alertness: Appropriate responses to stimuli;Delayed responses to stimuli  Following Commands: Follows one step commands with increased time;Follows one step commands with repetition  Attention Span: Attends with cues to redirect  Memory: Decreased short term memory;Decreased long term memory  Safety Judgement: Decreased awareness of need for assistance;Decreased awareness of need for safety  Problem  locking/unlocking brakes, backing up to surface, reaching back, controlled stand to sit and overall safety.   Neuromuscular Education  Neuromuscular education: Yes  NDT Treatment: Gait ;Sitting;Standing     Assessment  Assessment  Assessment: Pt tolerated session well and is prgressing towards goals. Pt remains limited by hearing deficits, cognitive deficits, pain, global weakness, decreased activity tolerance and poor safety. Pt req'd CGA/MIN A with bed mobility, functional transfers and functional mobility using 4WW. Pt req's A LOT of assist with LB care and set up seated with simple grooming tasks. Skilled OT indicated to increase safety and IND with all functional tasks to ensure a safe return to PLOF.  Activity Tolerance: Patient tolerated treatment well;Patient limited by endurance  Discharge Recommendations: Patient would benefit from continued therapy after discharge  Safety Devices  Safety Devices in place: Yes  Type of devices: All fall risk precautions in place;Left in chair;Nurse notified;Call light within reach;Chair alarm in place;Gait belt;Patient at risk for falls    Patient Education  Education  Education Given To: Patient  Education Provided: Mobility Training;Fall Prevention Strategies;Transfer Training;Energy Conservation;Precautions;Safety;Equipment  Education Method: Verbal;Teach Back;Printed Information/Hand-outs  Barriers to Learning: Hearing;Cognition  Education Outcome: Continued education needed;Verbalized understanding  Patient Education  - Spinal Precautions  - Log Roll  - Sit to Stand with Four Wheel Walker  - Reciprocal Gait with Four Wheel Walker  - Car Transfer with Walker and Surgery Precautions  - Understanding Energy Conservation  - Energy Conservation During Daily Tasks  - How to Prevent Falls  - Check for Safety  - Preventing Pressure Injuries    Plan  Occupational Therapy Plan  Times Per Week: 4-5x/wk 1x/day as lakeisha  Current Treatment Recommendations: Strengthening;Balance

## 2024-04-22 NOTE — PLAN OF CARE
Problem: Discharge Planning  Goal: Discharge to home or other facility with appropriate resources  4/22/2024 1418 by Som Weinberg, RN  Outcome: Progressing  Flowsheets (Taken 4/22/2024 1418)  Discharge to home or other facility with appropriate resources: Identify barriers to discharge with patient and caregiver     Problem: Pain  Goal: Verbalizes/displays adequate comfort level or baseline comfort level  4/22/2024 1418 by Som Weinberg, RN  Outcome: Progressing  Flowsheets (Taken 4/22/2024 1418)  Verbalizes/displays adequate comfort level or baseline comfort level: Encourage patient to monitor pain and request assistance     Problem: Safety - Adult  Goal: Free from fall injury  4/22/2024 1418 by Som Weinberg, RN  Outcome: Progressing  Flowsheets (Taken 4/22/2024 1418)  Free From Fall Injury: Instruct family/caregiver on patient safety

## 2024-04-22 NOTE — PLAN OF CARE
Problem: Discharge Planning  Goal: Discharge to home or other facility with appropriate resources  4/22/2024 0123 by Torey Gonzales RN  Outcome: Progressing  Flowsheets (Taken 4/21/2024 2000 by Rosina Ching RN)  Discharge to home or other facility with appropriate resources: Identify barriers to discharge with patient and caregiver  4/21/2024 1629 by Jennifer Vega RN  Outcome: Progressing  Flowsheets (Taken 4/21/2024 0845)  Discharge to home or other facility with appropriate resources:   Identify barriers to discharge with patient and caregiver   Identify discharge learning needs (meds, wound care, etc)     Problem: Pain  Goal: Verbalizes/displays adequate comfort level or baseline comfort level  4/22/2024 0123 by Torey Gonzales RN  Outcome: Progressing  4/21/2024 1629 by Jennifer Vega RN  Outcome: Progressing  Flowsheets (Taken 4/21/2024 0801)  Verbalizes/displays adequate comfort level or baseline comfort level:   Implement non-pharmacological measures as appropriate and evaluate response   Assess pain using appropriate pain scale     Problem: Safety - Adult  Goal: Free from fall injury  4/22/2024 0123 by Torey Gonzales RN  Outcome: Progressing  4/21/2024 1629 by Jennifer Vega RN  Outcome: Progressing     Problem: Skin/Tissue Integrity  Goal: Absence of new skin breakdown  Description: 1.  Monitor for areas of redness and/or skin breakdown  2.  Assess vascular access sites hourly  3.  Every 4-6 hours minimum:  Change oxygen saturation probe site  4.  Every 4-6 hours:  If on nasal continuous positive airway pressure, respiratory therapy assess nares and determine need for appliance change or resting period.  4/22/2024 0123 by Torey Gonzales RN  Outcome: Progressing  4/21/2024 1629 by Jennifer Vega RN  Outcome: Progressing     Problem: Neurosensory - Adult  Goal: Achieves stable or improved neurological status  4/22/2024 0123 by Torey Gonzales RN  Outcome:

## 2024-04-22 NOTE — PROGRESS NOTES
[facility]  Office: [phone]  Buddy Hill DO, Moy Marc DO, Rodolfo Juarez DO, Terrence Husain, DO, Claire Duarte MD, Demetria Quintanilla MD, Krish Vgea MD, Richelle Marroquin MD,  Gonzalez Najera MD, Adonis Obregon MD, Philippe Macedo MD,  Hilario Quispe DO, Adelita Parry MD, Praveen Boyle MD, Som Hill DO, Priscilla Richard MD,  Aldo Lamb DO, Brandy Mills MD, Asiya Pressley MD, Tena Ahmadi MD, Isak Salinas MD,  Andrae Khan MD, Eric Willett MD, Sharan Henry MD, Shannan Herrera MD, Waylon Dias MD, Sandra Lin MD, Jaquan Pearl DO, Jadon Parra DO, Bruce Ashraf MD,  Richie Vitale MD, Shirley Waterhouse, CNP,  Sandra Mayberry CNP, Junior Torres, CNP,  Wen Mcgarry, SAI, Darcie Vivas, CNP, Sara Cummings, CNP, Marina Valdez CNP, Karin Ji, CNP, Lindsey Mendez, PA-C, Stephy Colorado PA-C, Carri Mccoy, CNP, Leia Byrd, CNP, Jair Johnson, CNP, Rosario Sagastume, CNP, Eedn Ivory, CNP, Magda Roblero, CNS, Sloane Whitehead, CNP, Renita Buchanan CNP, Tracy Schwab, CNP       [facility]      Daily Progress Note     Admit Date: 4/20/2024  Bed/Room No.  2020/2020-01  Admitting Physician : Krish Vega MD  Code Status :Full Code  Hospital Day:  LOS: 2 days   Chief Complaint:     Chief Complaint   Patient presents with    Altered Mental Status     Pt had surgery on Monday, Family states since Thurs Pt has been increasingly confused. Freq urination.     Urinary Tract Infection     Principal Problem:    Delirium  Active Problems:    Hypothyroidism    Type 2 diabetes mellitus without complication (HCC)    Diabetes mellitus (HCC)    Hyperlipidemia    Hypertension    Esophageal reflux    Presenile dementia with depressive features (HCC)    Pure hypercholesterolemia    Acute urinary retention    Acute encephalopathy  Resolved Problems:    * No resolved hospital problems. *    Subjective :        Interval History/Significant events :   would be more sensitive.         CT CHEST ABDOMEN PELVIS WO CONTRAST Additional Contrast? None   Preliminary Result   CT Chest:      1. No acute intrathoracic abnormality.   2. Elevation of the right hemidiaphragm with no identifiable cause.   3. 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  Recommend thyroid ultrasound on a nonemergent   basis.   CT Abdomen/Pelvis:      1. No acute abnormality in the abdomen or pelvis.   2. Sigmoid diverticulosis with no evidence of diverticulitis.   3. Right renal pelvicaliectasis without danis hydronephrosis.  No   identifiable cause.   4. Small focus of gas in the urinary bladder.  Please correlate with recent   instrumentation which may account for this finding.   5. Cholecystectomy.   6. Hysterectomy.   CT Lumbar Spine:      1. L4 and L5 posterior decompression.  Small foci of gas in the soft tissue   is likely related to recent surgery.  Within the limitations of a noncontrast   exam, there is no formed fluid collection or abscess.  If clinical concern   persists, exam with contrast would be more sensitive.         CT HEAD WO CONTRAST   Final Result   Chronic microvascular disease without acute intracranial abnormality.      Chronic sinusitis involving the left maxillary sinus.              Clinical Course : stable  Assessment and Plan  :        Acute Delirium likely secondary to opiate medication.  Medication stopped. MRI brain negative for any acute infarct.  Urinary retention - Fraire was placed. Start Flomax. Remove Fraire and chest post Void   Hypertension - controlled.   Type 2 DM - on levimir, Farxiga, Humalog.  Sigmoid diverticulosis without diverticulitis -avoid constipation.  Hypothyroidism -continue Synthroid  Goiter -outpatient thyroid ultrasound  Hypokalemia -replace potassium.    Discharge planning to SNF   Void trial at discharge     Continue to monitor vitals , Intake / output ,  Cell count , HGB , Kidney

## 2024-04-22 NOTE — DISCHARGE INSTR - COC
Continuity of Care Form    Patient Name: Jeni Engel   :  1945  MRN:  [id number]    Admit date:  2024  Discharge date:  ***    Code Status Order: Full Code   Advance Directives:     Admitting Physician:  Krish Vega MD  PCP: Anibal Bartlett MD    Discharging Nurse: Som Weinberg RN    Discharging Hospital Unit/Room#:   Discharging Unit Phone Number: [phone]    Emergency Contact:   Extended Emergency Contact Information  Primary Emergency Contact: Geovanna Mccabe  Home Phone: [phone]  Work Phone: [phone]  Mobile Phone: [phone]  Relation: Child  Secondary Emergency Contact: Jesus Engel  Address: [address] [place] [address]  Home Phone: [phone]  Mobile Phone: [phone]  Relation: Child    Past Surgical History:  Past Surgical History:   Procedure Laterality Date    CERVICAL SPINE SURGERY       anterior  with hardware C4-C5    CHOLECYSTECTOMY, LAPAROSCOPIC N/A 03/10/2020    CHOLECYSTECTOMY LAPAROSCOPIC ROBOTIC XI performed by Davidson Moon MD at [facility] OR    COLONOSCOPY      x 2    EYE SURGERY Bilateral     monovision    FINGER TRIGGER RELEASE      right index, middle finger, left middle finger    HYSTERECTOMY (CERVIX STATUS UNKNOWN)      with BSO    KNEE ARTHROSCOPY Right     LAMINECTOMY N/A 4/15/2024    L3-S1 LUMBAR LAMINECTOMY POSTERIOR, CELL SAVER performed by Quincy Vasquez MD at [facility] OR    NEPHROSTOMY      and removal    SHOULDER ARTHROPLASTY Right     right reverse    URETER STENT PLACEMENT Right        Immunization History:     There is no immunization history on file for this patient.    Active Problems:  Patient Active Problem List   Diagnosis Code    Diabetes mellitus ([facility]) E11.9    Hyperlipidemia E78.5    Hypertension I10    Primary osteoarthritis of right shoulder M19.011    Severe obesity (BMI 35.0-39.9) with comorbidity ([facility]) E66.01    Acute arthritis M19.90    Degeneration of cervical intervertebral disc M50.30     Status Clean;Dry;Intact 04/22/24 1200   Dressing Change Due 04/21/24 04/20/24 1500   Incision Cleansed Soap and water;Other (Comment) 04/20/24 1500   Dressing/Treatment Dry dressing 04/22/24 1200   Closure Surgical glue 04/22/24 1200   Margins Approximated 04/22/24 1200   Incision Assessment Other (Comment) 04/22/24 1200   Drainage Amount None (dry) 04/22/24 1200   Drainage Description Serosanguinous 04/16/24 0908   Odor None 04/22/24 1200   Huong-incision Assessment Intact 04/22/24 1200   Number of days: 7        Elimination:  Continence:   Bowel: Yes  Bladder: Yes  Urinary Catheter: None   Colostomy/Ileostomy/Ileal Conduit: No       Date of Last BM: 04/23/2024    Intake/Output Summary (Last 24 hours) at 4/22/2024 1434  Last data filed at 4/22/2024 1010  Gross per 24 hour   Intake 300 ml   Output 3050 ml   Net -2750 ml     I/O last 3 completed shifts:  In: 2645 [P.O.:1620; I.V.:1025]  Out: 6625 [Urine:6625]    Safety Concerns:     At Risk for Falls    Impairments/Disabilities:      None    Nutrition Therapy:  Current Nutrition Therapy:   - Oral Diet:  General    Routes of Feeding: Oral  Liquids: Thin Liquids  Daily Fluid Restriction: no  Last Modified Barium Swallow with Video (Video Swallowing Test): not done    Treatments at the Time of Hospital Discharge:   Respiratory Treatments: none  Oxygen Therapy:  is not on home oxygen therapy.  Ventilator:    - No ventilator support    Rehab Therapies: Physical Therapy and Occupational Therapy  Weight Bearing Status/Restrictions: No weight bearing restrictions  Other Medical Equipment (for information only, NOT a DME order):  walker  Other Treatments:   Skilled nurse assessment  Medication teaching and compliance    Patient's personal belongings (please select all that are sent with patient):  None    RN SIGNATURE:  Electronically signed by Som Weinberg RN on 4/23/24 at 4:31 PM EDT    CASE MANAGEMENT/SOCIAL WORK SECTION    Inpatient Status Date: ***    Readmission Risk

## 2024-04-22 NOTE — PROGRESS NOTES
[facility] Neurology   IN-PATIENT SERVICE      NEUROLOGY PROGRESS  NOTE            Date:   4/22/2024  Patient name:  Jeni Engel  Date of admission:  4/20/2024  YOB: 1945      Interval History:     No acute events. Some waxing and waning mentation overnight. Seen asleep this AM but awakened and able to answer most questions, following commands. MRI brain pending.    History of Present Illness:     Per my partner:    The patient is a 79 y.o. female who presents with Altered Mental Status (Pt had surgery on Monday, Family states since Thurs Pt has been increasingly confused. Freq urination. ) and Urinary Tract Infection  . The patient was seen and examined and the chart was reviewed.      This is a 79 year old female who presented to the ED with chief complaint of AMS. Her daughter reports the patient has been increasingly confused for the last 2 to 3 days.  Patient will have times where she seems very lucid, alert and oriented..  Patient s/p lumbar surgery/ kyphoplasty on Monday, did have some acute urinary retention postoperatively in the hospital.  Patient was given Norco for pain but family stopped this due to confusion. Family reports confusion is new for her.     Past Medical History:     Past Medical History:   Diagnosis Date    Acid reflux disease     Arthritis     OA    Chronic cholecystitis     Chronic kidney disease     stage 2 per patient    Depression     Diabetes mellitus (HCC) 2003    type 2    Dizziness     Falls frequently     patient states her legs give out    Glaucoma     both eyes per patient    Hard of hearing     wears bilateral hearing aids    Hx of bilateral cataract extraction     Hyperlipidemia     Hypertension     Multiple thyroid nodules     \"have been watching them for years\"    PMR (polymyalgia rheumatica) ([facility])     Sleep apnea     does not use machine    Vitamin D deficiency     patient states better    Wears glasses     for reading        Past Surgical History:      Past Surgical History:   Procedure Laterality Date    CERVICAL SPINE SURGERY       anterior  with hardware C4-C5    CHOLECYSTECTOMY, LAPAROSCOPIC N/A 03/10/2020    CHOLECYSTECTOMY LAPAROSCOPIC ROBOTIC XI performed by Davidson Moon MD at [facility] OR    COLONOSCOPY      x 2    EYE SURGERY Bilateral     monovision    FINGER TRIGGER RELEASE      right index, middle finger, left middle finger    HYSTERECTOMY (CERVIX STATUS UNKNOWN)      with BSO    KNEE ARTHROSCOPY Right     LAMINECTOMY N/A 4/15/2024    L3-S1 LUMBAR LAMINECTOMY POSTERIOR, CELL SAVER performed by Quincy Vasquez MD at [facility] OR    NEPHROSTOMY      and removal    SHOULDER ARTHROPLASTY Right     right reverse    URETER STENT PLACEMENT Right         Medications during admission:      atorvastatin  40 mg Oral Daily    buPROPion  300 mg Oral QAM    busPIRone  5 mg Oral QAM    [Held by provider] furosemide  20 mg Oral Daily    metoprolol  100 mg Oral Daily    famotidine  20 mg Oral BID    sodium chloride flush  5-40 mL IntraVENous 2 times per day    amLODIPine  5 mg Oral Daily    lisinopril  10 mg Oral Daily    [Held by provider] insulin glargine  38 Units SubCUTAneous QAM AC         Physical Exam:   BP (!) 155/82   Pulse 97   Temp 98.2 °F (36.8 °C) (Oral)   Resp 16   Ht 1.524 m (5')   Wt 88.5 kg (195 lb)   SpO2 95%   BMI 38.08 kg/m²   Temp (24hrs), Av.7 °F (37.1 °C), Min:98.2 °F (36.8 °C), Max:99.1 °F (37.3 °C)          Neurological examination:    Mental status   Awake, oriented to person, time (2024), not place. Knows current president. Inattentive with at times nonsensical speech requiring redirectioning; following all commands; speech is fluent, no dysarthria, aphasia.      Cranial nerves   II - visual fields intact to confrontation; pupils reactive (4 mm OU)  III, IV, VI - extraocular muscles intact; no MAR; no nystagmus; no ptosis   V - normal facial sensation                                                               VII -

## 2024-04-23 VITALS
TEMPERATURE: 98.4 F | RESPIRATION RATE: 17 BRPM | BODY MASS INDEX: 38.28 KG/M2 | HEIGHT: 60 IN | OXYGEN SATURATION: 95 % | DIASTOLIC BLOOD PRESSURE: 72 MMHG | HEART RATE: 80 BPM | WEIGHT: 195 LBS | SYSTOLIC BLOOD PRESSURE: 141 MMHG

## 2024-04-23 PROBLEM — R41.89 COGNITIVE IMPAIRMENT: Status: ACTIVE | Noted: 2024-04-23

## 2024-04-23 LAB
GLUCOSE BLD-MCNC: 219 MG/DL (ref 65–105)
GLUCOSE BLD-MCNC: 232 MG/DL (ref 65–105)
GLUCOSE BLD-MCNC: 244 MG/DL (ref 65–105)

## 2024-04-23 PROCEDURE — 2580000003 HC RX 258: Performed by: NURSE PRACTITIONER

## 2024-04-23 PROCEDURE — 82947 ASSAY GLUCOSE BLOOD QUANT: CPT

## 2024-04-23 PROCEDURE — 99239 HOSP IP/OBS DSCHRG MGMT >30: CPT | Performed by: FAMILY MEDICINE

## 2024-04-23 PROCEDURE — 6370000000 HC RX 637 (ALT 250 FOR IP): Performed by: NURSE PRACTITIONER

## 2024-04-23 PROCEDURE — 6370000000 HC RX 637 (ALT 250 FOR IP): Performed by: FAMILY MEDICINE

## 2024-04-23 PROCEDURE — 97530 THERAPEUTIC ACTIVITIES: CPT

## 2024-04-23 PROCEDURE — 99232 SBSQ HOSP IP/OBS MODERATE 35: CPT | Performed by: PSYCHIATRY & NEUROLOGY

## 2024-04-23 PROCEDURE — 97116 GAIT TRAINING THERAPY: CPT

## 2024-04-23 PROCEDURE — 97110 THERAPEUTIC EXERCISES: CPT

## 2024-04-23 RX ADMIN — ACETAMINOPHEN 650 MG: 325 TABLET ORAL at 06:45

## 2024-04-23 RX ADMIN — METOPROLOL 100 MG: 100 TABLET ORAL at 09:25

## 2024-04-23 RX ADMIN — NYSTATIN 500000 UNITS: 100000 SUSPENSION ORAL at 14:12

## 2024-04-23 RX ADMIN — NYSTATIN 500000 UNITS: 100000 SUSPENSION ORAL at 09:26

## 2024-04-23 RX ADMIN — LISINOPRIL 10 MG: 10 TABLET ORAL at 09:26

## 2024-04-23 RX ADMIN — FAMOTIDINE 20 MG: 20 TABLET, FILM COATED ORAL at 09:26

## 2024-04-23 RX ADMIN — BUSPIRONE HYDROCHLORIDE 5 MG: 5 TABLET ORAL at 09:26

## 2024-04-23 RX ADMIN — AMLODIPINE BESYLATE 5 MG: 5 TABLET ORAL at 09:25

## 2024-04-23 RX ADMIN — TAMSULOSIN HYDROCHLORIDE 0.4 MG: 0.4 CAPSULE ORAL at 09:26

## 2024-04-23 RX ADMIN — BUPROPION HYDROCHLORIDE 300 MG: 150 TABLET, EXTENDED RELEASE ORAL at 09:26

## 2024-04-23 RX ADMIN — Medication 500 MG: at 09:26

## 2024-04-23 RX ADMIN — DOCUSATE SODIUM 50MG AND SENNOSIDES 8.6MG 1 TABLET: 8.6; 5 TABLET, FILM COATED ORAL at 09:25

## 2024-04-23 RX ADMIN — ATORVASTATIN CALCIUM 40 MG: 40 TABLET, FILM COATED ORAL at 09:26

## 2024-04-23 RX ADMIN — INSULIN LISPRO 1 UNITS: 100 INJECTION, SOLUTION INTRAVENOUS; SUBCUTANEOUS at 12:39

## 2024-04-23 RX ADMIN — INSULIN LISPRO 1 UNITS: 100 INJECTION, SOLUTION INTRAVENOUS; SUBCUTANEOUS at 09:25

## 2024-04-23 RX ADMIN — ESCITALOPRAM OXALATE 20 MG: 10 TABLET ORAL at 09:26

## 2024-04-23 RX ADMIN — SODIUM CHLORIDE, PRESERVATIVE FREE 10 ML: 5 INJECTION INTRAVENOUS at 09:26

## 2024-04-23 ASSESSMENT — ENCOUNTER SYMPTOMS
CHEST TIGHTNESS: 0
BACK PAIN: 1
WHEEZING: 0
CONSTIPATION: 0
RHINORRHEA: 0
VOMITING: 0
ABDOMINAL PAIN: 0
NAUSEA: 0
SHORTNESS OF BREATH: 0
DIARRHEA: 0
COUGH: 0
BLOOD IN STOOL: 0

## 2024-04-23 ASSESSMENT — PAIN DESCRIPTION - LOCATION: LOCATION: BACK;HEAD

## 2024-04-23 ASSESSMENT — PAIN DESCRIPTION - ORIENTATION: ORIENTATION: MID

## 2024-04-23 ASSESSMENT — PAIN DESCRIPTION - DESCRIPTORS: DESCRIPTORS: ACHING

## 2024-04-23 ASSESSMENT — PAIN SCALES - GENERAL: PAINLEVEL_OUTOF10: 5

## 2024-04-23 NOTE — CARE COORDINATION
Social work: emily called [phone] and would like to send a referral to [facility] as a back up plan in case [facility] is full. Not sure if that snf is in network asked them to get back to advise if they are in network. Referral sent. [facility]. Will need colleen and Rx at discharge. Wen pressley

## 2024-04-23 NOTE — CARE COORDINATION
Social Work-COntacted [facility]. They are full. [facility] has bed. [facility] is [facility]. Discussed with patient and daughter. They have decided on home. Discussed home care. They are agreeable with home care. Santos was current with [facility] and would like [facility] [place]. Discussed with . Jenna

## 2024-04-23 NOTE — CARE COORDINATION
Discharge Planning    Phone call to Linda at [facility] and confirmed they can accept her.  GRACE completed.    Pt updated and clarified that she has private pay aides as these are the aides she used to help care for her  when he was alive.

## 2024-04-23 NOTE — PROGRESS NOTES
gas in the soft tissue   is likely related to recent surgery.  Within the limitations of a noncontrast   exam, there is no formed fluid collection or abscess.  If clinical concern   persists, exam with contrast would be more sensitive.         CT CHEST ABDOMEN PELVIS WO CONTRAST Additional Contrast? None   Final Result   CT Chest:      1. No acute intrathoracic abnormality.   2. Elevation of the right hemidiaphragm with no identifiable cause.   3. 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  Recommend thyroid ultrasound on a nonemergent   basis.   CT Abdomen/Pelvis:      1. No acute abnormality in the abdomen or pelvis.   2. Sigmoid diverticulosis with no evidence of diverticulitis.   3. Right renal pelvicaliectasis without danis hydronephrosis.  No   identifiable cause.   4. Small focus of gas in the urinary bladder.  Please correlate with recent   instrumentation which may account for this finding.   5. Cholecystectomy.   6. Hysterectomy.   CT Lumbar Spine:      1. L4 and L5 posterior decompression.  Small foci of gas in the soft tissue   is likely related to recent surgery.  Within the limitations of a noncontrast   exam, there is no formed fluid collection or abscess.  If clinical concern   persists, exam with contrast would be more sensitive.         CT HEAD WO CONTRAST   Final Result   Chronic microvascular disease without acute intracranial abnormality.      Chronic sinusitis involving the left maxillary sinus.              Clinical Course : stable  Assessment and Plan  :        Acute Delirium likely secondary to opiate medication.  Medication stopped. MRI brain negative for any acute infarct.  Urinary retention - Fraire was placed.  Continue Flomax.  Avoid constipation  Hypertension - controlled.   Type 2 DM - on levimir, Farxiga, Humalog.  Sigmoid diverticulosis without diverticulitis -avoid constipation.  Hypothyroidism -continue Synthroid  Goiter

## 2024-04-23 NOTE — PROGRESS NOTES
Occupational Therapy  Facility/Department: [facility]  Rehabilitation Occupational Therapy Daily Treatment Note    Date: 24  Patient Name: Jeni Engel       Room:   MRN: [id number]  Account: [id number]   : 1945  (79 y.o.) Gender: female                    Past Medical History:  has a past medical history of Acid reflux disease, Arthritis, Chronic cholecystitis, Chronic kidney disease, Depression, Diabetes mellitus (HCC), Dizziness, Falls frequently, Glaucoma, Hard of hearing, Hx of bilateral cataract extraction, Hyperlipidemia, Hypertension, Multiple thyroid nodules, PMR (polymyalgia rheumatica) ([facility]), Sleep apnea, Vitamin D deficiency, and Wears glasses.  Past Surgical History:   has a past surgical history that includes eye surgery (Bilateral); Hysterectomy (); Nephrostomy (); Ureter stent placement (Right, ); Knee arthroscopy (Right); Finger trigger release; Colonoscopy; Cervical spine surgery; Cholecystectomy, laparoscopic (N/A, 03/10/2020); Shoulder Arthroplasty (Right); and laminectomy (N/A, 4/15/2024).    Restrictions  Restrictions/Precautions: General Precautions, Fall Risk, Up as Tolerated  Other position/activity restrictions: up with assist, KYPHO 4/15; Telesitter  Required Braces or Orthoses?: No    Subjective  Subjective: Pt in chair upon arrival and agreeable to therapy.  Restrictions/Precautions: General Precautions;Fall Risk;Up as Tolerated             Objective     Cognition  Overall Cognitive Status: Exceptions  Arousal/Alertness: Appropriate responses to stimuli  Following Commands: Follows one step commands with increased time;Follows one step commands with repetition;Inconsistently follows commands  Attention Span: Attends with cues to redirect  Memory: Decreased short term memory  Safety Judgement: Decreased awareness of need for assistance;Decreased awareness of need for safety  Problem Solving: Decreased awareness of errors;Assistance required to  Seated Shoulder Flexion Full Range  - 1 x daily - 7 x weekly - 3 sets - 10 reps    Plan  Occupational Therapy Plan  Times Per Week: 4-5x/wk 1x/day as lakeisha  Current Treatment Recommendations: Strengthening;Balance training;Functional mobility training;Endurance training;Safety education & training;Cognitive reorientation;Equipment evaluation, education, & procurement;Patient/Caregiver education & training;Self-Care / ADL;Cognitive/Perceptual training;Positioning    Goals  Patient Goals   Patient goals : To go home!  Short Term Goals  Time Frame for Short Term Goals: By discharge, pt to demo  Short Term Goal 1: ADL transfers and functional mobility to CGA with use of AD and proper .  Short Term Goal 2: UB ADLs to SBA and LB ADLs to Min A with use of AD/AE and verbal cues as needed.  Short Term Goal 3: bed mobility to SBA with use of bedrails as needed while adhereing to surgical precautions.  Short Term Goal 4: toileting to Min A with use of AD/grab bars and verbal cues as needed.  Short Term Goal 5: increased B UE strength by 1/2 grade to assist with functional tasks/I with B UE HEP with use of handouts as needed.  Long Term Goals  Long Term Goal 1: Pt/caregiver to be I with fall prevention edu, EC/WS Tech, surgical precautions, cognitive/memory statigies, pressure relief/skin integrity edu with use of handouts as needed.    AM-PAC Score        AM-PAC Inpatient Daily Activity Raw Score: 16 (04/23/24 1036)  AM-PAC Inpatient ADL T-Scale Score : 35.96 (04/23/24 1036)  ADL Inpatient CMS 0-100% Score: 53.32 (04/23/24 1036)  ADL Inpatient CMS G-Code Modifier : CK (04/23/24 1036)      Therapy Time   Individual Concurrent Group Co-treatment   Time In 1032         Time Out 1057         Minutes 25                 IRENE Morocho

## 2024-04-23 NOTE — PROGRESS NOTES
[facility] Neurology   IN-PATIENT SERVICE      NEUROLOGY PROGRESS  NOTE            Date:   4/23/2024  Patient name:  Jeni Engel  Date of admission:  4/20/2024  YOB: 1945      Interval History:     4/23: MRI brain done yesterday. No new neuro complaints or symptoms. Mentation significantly improved, oriented x 4, attentive, at baseline.  Called daughter to update.  Per daughter, patient is now at baseline.  Over the last 1 to 2 years, she has noticed some difficulties with short-term memory and forgetfulness.  Otherwise, she is independent in ADLs, lives by herself.    4/22: No acute events. Some waxing and waning mentation overnight. Seen asleep this AM but awakened and able to answer most questions, following commands. MRI brain pending.    History of Present Illness:     Per my partner:    The patient is a 79 y.o. female who presents with Altered Mental Status (Pt had surgery on Monday, Family states since Thurs Pt has been increasingly confused. Freq urination. ) and Urinary Tract Infection  . The patient was seen and examined and the chart was reviewed.      This is a 79 year old female who presented to the ED with chief complaint of AMS. Her daughter reports the patient has been increasingly confused for the last 2 to 3 days.  Patient will have times where she seems very lucid, alert and oriented..  Patient s/p lumbar surgery/ kyphoplasty on Monday, did have some acute urinary retention postoperatively in the hospital.  Patient was given Norco for pain but family stopped this due to confusion. Family reports confusion is new for her.     Past Medical History:     Past Medical History:   Diagnosis Date    Acid reflux disease     Arthritis     OA    Chronic cholecystitis     Chronic kidney disease     stage 2 per patient    Depression     Diabetes mellitus (HCC) 2003    type 2    Dizziness     Falls frequently     patient states her legs give out    Glaucoma     both eyes per patient    Hard of  [id number] 529 04/22/2024    MG 1.9 04/21/2024    PHOS 3.1 12/15/2022         Imaging/Diagnostics:      Results for orders placed during the hospital encounter of 09/12/16    MRI Brain W WO Contrast    Narrative  MRI brain with and without intravenous contrast, 9/12/2016    History: Dizziness and hearing loss.    Technique: Multiplanar multisequence MR imaging of the brain was performed before and after intravenous administration of 15 mL of Magnevist.    Findings: No evidence for shift of midline structures, mass effect or compression the ventricles noted. No acute intra-or extra-axial hemorrhage is seen. No abnormal intracranial fluid collections are identified.    The basal cisterns are patent. Posterior fossa structures demonstrate no discrete mass.    Hyperintense signal on postcontrast imaging within the C4 and C5 vertebral bodies noted possibly relate with incomplete fat suppression however. Metastatic disease is not excluded. However this is not well assessed on this MRI examination of the brain    Mucosal thickening of the maxillary sinuses. Mucosal thickening of the ethmoid air cells.    Multiple scattered foci of T2/FLAIR hyperintensity in the periventricular and subcortical white matter which are nonspecific in imaging appearance however likely represent chronic small vessel disease.    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    Interhemispheric calcifications.    Chronic small vessel disease of the brainstem is also suspected.    No discrete mass in the cerebellopontine angle cistern. No abnormal enhancing IAC mass. No definite evidence for retrocochlear pathology. The vestibular cochlear and facial nerve complexes are essentially within normal limits. Mild atrophy of the  cerebellum.    Impression: No abnormal enhancing IAC or cerebellopontine angle cistern mass. No evidence for acute or subacute ischemic insult. No definite

## 2024-04-23 NOTE — PLAN OF CARE
Patient came in with AMS, and Urinary retention/UTI.  She is S/P Kypho.  She has a zavala in place.  She is a 1 assist when getting up.  Bed alarm is on.  Patient is alert and oriented and sometimes confused. No other complaints at this time.  Plan of care is ongoing.    Pain level assessment complete.   Patient educated on pain scale and control interventions  PRN pain medication given per patient request  Patient instructed to call out with new onset of pain or unrelieved pain    Problem: Pain  Goal: Verbalizes/displays adequate comfort level or baseline comfort level  Outcome: Progressing     Siderails up x 2  Hourly rounding  Call light in reach  Instructed to call for assist before attempting out of bed.  Remains free from falls and accidental injury at this time   Floor free from obstacles  Bed is locked and in lowest position  Adequate lighting provided  Bed alarm on, Red Falling star and Stay with Me signs posted      Problem: Safety - Adult  Goal: Free from fall injury  Outcome: Progressing     Checked for incontinence every 2 hours and prn.  Pericare as needed.  Assisted to reposition off back frequently.  On waffle mattress.  Heels off bed with pillows.    Problem: Skin/Tissue Integrity  Goal: Absence of new skin breakdown  Description: 1.  Monitor for areas of redness and/or skin breakdown  2.  Assess vascular access sites hourly  3.  Every 4-6 hours minimum:  Change oxygen saturation probe site  4.  Every 4-6 hours:  If on nasal continuous positive airway pressure, respiratory therapy assess nares and determine need for appliance change or resting period.  Outcome: Progressing     Problem: Neurosensory - Adult  Goal: Achieves stable or improved neurological status  Outcome: Progressing     Problem: Respiratory - Adult  Goal: Achieves optimal ventilation and oxygenation  Outcome: Progressing     Problem: Discharge Planning  Goal: Discharge to home or other facility with appropriate resources  Outcome:

## 2024-04-23 NOTE — PROGRESS NOTES
Physical Therapy  Facility/Department: [facility] MED [facility]  Rehabilitation Physical Therapy Treatment Note    NAME: Jeni Engel  : 1945 (79 y.o.)  MRN: [id number]  CODE STATUS: Full Code    Date of Service: 24     Pt currently functioning below baseline.  Recommend daily inpatient skilled therapy at time of discharge to maximize long term outcomes and prevent re-admission. Please refer to AM-PAC score for current level of function.     Restrictions:  Restrictions/Precautions: General Precautions, Fall Risk, Up as Tolerated  Position Activity Restriction  Spinal Precautions: No Bending, No Lifting, No Twisting  Other position/activity restrictions: up with assist, KYPHO 4/15; Telesitter     SUBJECTIVE  Subjective  Subjective: Patient up in bed upon therapists arrival. RN reports patient is medically stable for therapy treatment this date.    Chart reviewed prior to treatment and patient is agreeable for therapy.  All lines intact and patient positioned comfortably at end of treatment. Chair alarm on and tested for patient safety.       OBJECTIVE  Cognition  Overall Cognitive Status: Exceptions  Arousal/Alertness: Appropriate responses to stimuli  Following Commands: Follows one step commands with increased time;Follows one step commands with repetition;Inconsistently follows commands  Attention Span: Attends with cues to redirect  Memory: Decreased short term memory  Safety Judgement: Decreased awareness of need for assistance;Decreased awareness of need for safety  Problem Solving: Decreased awareness of errors;Assistance required to correct errors made;Assistance required to identify errors made;Assistance required to generate solutions;Assistance required to implement solutions  Insights: Decreased awareness of deficits  Initiation: Requires cues for all  Sequencing: Requires cues for some  Cognition Comment: Friend/caregiver reporting patient is normally \"pretty with it cognitively, however has been  showing mild confusion/forgetfullness intermittent\";  Orientation  Overall Orientation Status: Within Functional Limits    Functional Mobility  Bed Mobility  Overall Assistance Level: Stand By Assist;Contact Guard Assist  Additional Factors: Set-up;Verbal cues;Increased time to complete  Roll Left  Assistance Level: Stand by assist  Roll Right  Assistance Level: Stand by assist  Sit to Supine  Skilled Clinical Factors: unable to assess as patient retires to side chair upon completion of PT treatment.  Supine to Sit  Assistance Level: Stand by assist  Skilled Clinical Factors: vc's for technique with increased time and effort for progression into upright posture.  Scooting  Assistance Level: Stand by assist  Skilled Clinical Factors: vc's for scooting all the way out and placing feet flat on the floor for increased stability and support before progression into stance.  Balance  Sitting Balance: Supervision  Standing Balance: Stand by assistance  Standing Balance  Time: 2 minutes  Activity: Renae performs lateral WS and mini marches before progression to ambulation to ensure stability with UE support on RW assistive device for balance.  Transfers  Surface: To chair with arms;From bed  Additional Factors: Set-up;Verbal cues;Hand placement cues;Increased time to complete;With handrails  Device: Walker  Sit to Stand  Assistance Level: Contact guard assist  Skilled Clinical Factors: vc's for hand placement and pushing off of bed into stance with vc's for \"nose over toes\" technique  Stand to Sit  Assistance Level: Stand by assist  Skilled Clinical Factors: vc's for slow controled descent into seated posture with demo of fair eccentric quad control  Bed To/From Chair  Technique: Stand step  Assistance Level: Stand by assist  Skilled Clinical Factors: vc's for positioning with assistive device and safety awareness (not allowing RW to become too far away for safety)      Environmental Mobility  Ambulation  Surface: Level

## 2024-04-23 NOTE — PLAN OF CARE
Problem: Discharge Planning  Goal: Discharge to home or other facility with appropriate resources  4/23/2024 1616 by Som Weinberg, RN  Outcome: Progressing  Flowsheets (Taken 4/23/2024 1616)  Discharge to home or other facility with appropriate resources: Identify barriers to discharge with patient and caregiver     Problem: Pain  Goal: Verbalizes/displays adequate comfort level or baseline comfort level  4/23/2024 1616 by Som Weinberg, RN  Outcome: Progressing  Flowsheets (Taken 4/23/2024 1616)  Verbalizes/displays adequate comfort level or baseline comfort level: Encourage patient to monitor pain and request assistance     Problem: Safety - Adult  Goal: Free from fall injury  4/23/2024 1616 by Som Weinberg, RN  Outcome: Progressing  Flowsheets (Taken 4/23/2024 1616)  Free From Fall Injury: Instruct family/caregiver on patient safety     Problem: Skin/Tissue Integrity  Goal: Absence of new skin breakdown  Description: 1.  Monitor for areas of redness and/or skin breakdown  2.  Assess vascular access sites hourly  3.  Every 4-6 hours minimum:  Change oxygen saturation probe site  4.  Every 4-6 hours:  If on nasal continuous positive airway pressure, respiratory therapy assess nares and determine need for appliance change or resting period.  4/23/2024 1616 by Som Weinberg, RN  Outcome: Progressing

## 2024-04-23 NOTE — PLAN OF CARE
Problem: Discharge Planning  Goal: Discharge to home or other facility with appropriate resources  4/23/2024 1750 by Som Weinberg RN  Outcome: Adequate for Discharge     Problem: Pain  Goal: Verbalizes/displays adequate comfort level or baseline comfort level  4/23/2024 1750 by Som Weinberg RN  Outcome: Adequate for Discharge     Problem: Safety - Adult  Goal: Free from fall injury  4/23/2024 1750 by Som Weinberg RN  Outcome: Adequate for Discharge     Problem: Skin/Tissue Integrity  Goal: Absence of new skin breakdown  Description: 1.  Monitor for areas of redness and/or skin breakdown  2.  Assess vascular access sites hourly  3.  Every 4-6 hours minimum:  Change oxygen saturation probe site  4.  Every 4-6 hours:  If on nasal continuous positive airway pressure, respiratory therapy assess nares and determine need for appliance change or resting period.  4/23/2024 1750 by Som Weinberg RN  Outcome: Adequate for Discharge     Problem: Neurosensory - Adult  Goal: Achieves stable or improved neurological status  4/23/2024 1750 by Som Weinberg RN  Outcome: Adequate for Discharge     Problem: Respiratory - Adult  Goal: Achieves optimal ventilation and oxygenation  4/23/2024 1750 by Som Weinberg RN  Outcome: Adequate for Discharge     Problem: Skin/Tissue Integrity - Adult  Goal: Skin integrity remains intact  4/23/2024 1750 by Som Weinberg RN  Outcome: Adequate for Discharge     Problem: Skin/Tissue Integrity - Adult  Goal: Incisions, wounds, or drain sites healing without S/S of infection  4/23/2024 1750 by Som Weinberg RN  Outcome: Adequate for Discharge     Problem: Musculoskeletal - Adult  Goal: Return mobility to safest level of function  4/23/2024 1750 by Som Weinberg RN  Outcome: Adequate for Discharge     Problem: Musculoskeletal - Adult  Goal: Maintain proper alignment of affected body part  4/23/2024 1750 by Som Weinberg RN  Outcome: Adequate for Discharge

## 2024-04-24 NOTE — DISCHARGE SUMMARY
[facility]  Office: [phone]  Buddy Hill DO, Moy Marc DO, Rodolfo Juarez DO, Terrence Husain DO, Claire Duarte MD, Demetria Quintanilla MD, Krish Vega MD, Richelle Marroquin MD,  Gonzalez Najera MD, Adonis Obregon MD, Jadon Parra DO, Philippe Macedo MD,  Hilario Quispe DO, Adelita Parry MD, Praveen Boyle MD, Som Hill DO, Priscilla Richard MD,  Aldo Lamb DO, Brandy Mills MD, Asiya Pressley MD, Tena Ahmadi MD, Isak Salinas MD,  Andrae Khan MD, Eric Willett MD, Sharan Henry MD, Shannan Herrera MD, Jaquan Pearl DO, Bruce Ashraf MD,  Richie Vitale MD, Waylon Dias MD, Shirley Waterhouse, CNP,  Sandra Mayberry, CNP,, Junior Torres, CNP,  Wen Mcgarry, DNP, Darcie Vivas, CNP, Sara Cummings, CNP, Eden Ivory, CNP, Marina Valdez, CNP, Karin Ji, CNP, Rosario Sagastume, CNP, Magda Roblero, CNS, Sloane Whitehead, CNP, Renita Buchanan, CNP                  [facility]      Discharge Summary     Patient ID: Jeni Engel  :  1945   MRN: [id number]     ACCOUNT:  [id number]   Patient Location :   Patient's PCP: Anibal Bartlett MD  Admit Date: 2024   Discharge Date: 2024     Length of Stay: 3  Code Status:  Prior  Admitting Physician: Krish Vega MD  Discharge Physician: Krish Vega MD     Active Discharge Diagnosis :     Primary Problem  Delirium      Hospital Problems  Active Hospital Problems    Diagnosis Date Noted    Type 2 diabetes mellitus without complication (HCC) [E11.9] 2018     Priority: Medium    Hypothyroidism [E03.9] 2018     Priority: Medium    Cognitive impairment [R41.89] 2024    Acute encephalopathy [G93.40] 2024    Altered mental status [R41.82] 2024    Chronic maxillary sinusitis [J32.0] 2024    Elevated troponin [R79.89] 2024    Delirium [R41.0] 2024    Acute urinary retention [R33.8] 2024    Pure hypercholesterolemia [E78.00]      Physician Follow Up:     [facility]  [address] [place] [address]  [phone]        Angela Cortés, DO  [address] [facility] [id number] [phone]    Schedule an appointment as soon as possible for a visit in 1 week(s)      Anibal Bart[place]  [address] [facility] [address] [place] [id number]    Follow up  As needed       Requiring Further Evaluation/Follow Up POST HOSPITALIZATION/Incidental Findings: follow up with PCP     Diet: diabetic diet    Activity: As tolerated.    Instructions to Patient: PT OT     Discharge Medications:      Medication List        START taking these medications      nystatin [id number] UNIT/ML suspension  Commonly known as: MYCOSTATIN  Take 5 mLs by mouth 4 times daily     tamsulosin 0.4 MG capsule  Commonly known as: FLOMAX  Take 1 capsule by mouth daily            CONTINUE taking these medications      acetaminophen 500 MG tablet  Commonly known as: TYLENOL     amLODIPine-benazepril 5-10 MG per capsule  Commonly known as: LOTREL     atorvastatin 40 MG tablet  Commonly known as: LIPITOR     buPROPion 300 MG extended release tablet  Commonly known as: WELLBUTRIN XL     busPIRone 5 MG tablet  Commonly known as: BUSPAR     calcium carbonate 500 MG chewable tablet  Commonly known as: TUMS     dapagliflozin 10 MG tablet  Commonly known as: FARXIGA     escitalopram 20 MG tablet  Commonly known as: LEXAPRO     famotidine 20 MG tablet  Commonly known as: PEPCID     furosemide 20 MG tablet  Commonly known as: LASIX     insulin lispro 100 UNIT/ML injection vial  Commonly known as: HUMALOG     Levemir FlexPen 100 UNIT/ML injection pen  Generic drug: insulin detemir     metoprolol 100 MG tablet  Commonly known as: LOPRESSOR     sennosides-docusate sodium 8.6-50 MG tablet  Commonly known as: SENOKOT-S  Take 1 tablet by mouth 2 times daily            STOP taking these medications      HYDROcodone-acetaminophen 5-325 MG per tablet  Commonly known as: NORCO     methocarbamol 750

## 2024-04-25 LAB
MICROORGANISM SPEC CULT: NORMAL
MICROORGANISM SPEC CULT: NORMAL
SERVICE CMNT-IMP: NORMAL
SERVICE CMNT-IMP: NORMAL
SPECIMEN DESCRIPTION: NORMAL
SPECIMEN DESCRIPTION: NORMAL

## 2024-05-02 NOTE — PROGRESS NOTES
Physician Progress Note      PATIENT:               ISSA KESSLER  CSN #:                  [id number]  :                       1945  ADMIT DATE:       2024 10:08 AM  DISCH DATE:        2024 5:53 PM  RESPONDING  PROVIDER #:        Krish Vega MD          QUERY TEXT:    Pt admitted with AMS and has encephalopathy documented. If possible, please   document in progress notes and discharge summary further specificity regarding   the type of encephalopathy:    The medical record reflects the following:  Risk Factors: Advanced age, Opiate use  Clinical Indicators:  neurology progress note documented on Acute   encephalopathy, waxing/waning, likely delirium in setting of urinary   retention, recent lumbar laminectomy, opioids, resolved.  discharge   summary documented on Acute Delirium likely secondary to opiate medication.   Medication stopped. MRI brain negative for any acute infarct. GCS of 15,   intermittent confusion  Treatment: IVF, labs, Meds stopped    Thank you,  Abigail ALEJO RN  Options provided:  -- Metabolic encephalopathy  -- Toxic metabolic encephalopathy  -- Encephalopathy due to, please specify.  -- Other - I will add my own diagnosis  -- Disagree - Not applicable / Not valid  -- Disagree - Clinically unable to determine / Unknown  -- Refer to Clinical Documentation Reviewer    PROVIDER RESPONSE TEXT:    This patient has toxic metabolic encephalopathy.    Query created by: Isabel Cummings on 2024 11:49 AM      Electronically signed by:  Krish Vega MD 2024 6:58 PM

## (undated) DEVICE — 4.0MM PRECISION ROUND

## (undated) DEVICE — COVER,MAYO STAND,XL,STERILE: Brand: MEDLINE

## (undated) DEVICE — SUTURE VICRYL + SZ 0 L27IN ABSRB UD L36MM CT-1 1/2 CIR VCPP41D

## (undated) DEVICE — SUTURE MONOCRYL SZ 4-0 L27IN ABSRB UD L19MM PS-2 1/2 CIR PRIM Y426H

## (undated) DEVICE — BLANKET WRM W29.9XL79.1IN UP BODY FORC AIR MISTRAL-AIR

## (undated) DEVICE — ST CHARLES GEN LAPAROSCOPY PK: Brand: MEDLINE INDUSTRIES, INC.

## (undated) DEVICE — Device

## (undated) DEVICE — CANNULA SEAL

## (undated) DEVICE — GOWN,AURORA,NONREINFORCED,LARGE: Brand: MEDLINE

## (undated) DEVICE — KIT EVAC 0.13IN RECT TB DIA10FR 400CC PVC 3 SPR Y CONN DRN

## (undated) DEVICE — GAUZE, BORDER, 3"X6", 1.5"X4"PAD, STERIL: Brand: MEDLINE INDUSTRIES, INC.

## (undated) DEVICE — 4-PORT MANIFOLD: Brand: NEPTUNE 2

## (undated) DEVICE — 1010 S-DRAPE TOWEL DRAPE 10/BX: Brand: STERI-DRAPE™

## (undated) DEVICE — SUTURE VICRYL + SZ 2-0 L36IN ABSRB UD L36MM CT-1 1/2 CIR VCP945H

## (undated) DEVICE — STRAP,POSITIONING,KNEE/BODY,FOAM,4X60": Brand: MEDLINE

## (undated) DEVICE — Z INACTIVE USE 2641839 CLIP INT M L POLYMER LOK LIG HEM O LOK

## (undated) DEVICE — SUTURE MCRYL + SZ 4-0 L27IN ABSRB UD L19MM PS-2 3/8 CIR MCP426H

## (undated) DEVICE — TROCARS: Brand: KII® BALLOON BLUNT TIP SYSTEM

## (undated) DEVICE — COVER,TABLE,60X90,STERILE: Brand: MEDLINE

## (undated) DEVICE — GLOVE SURG SZ 85 CRM LTX FREE POLYISOPRENE POLYMER BEAD ANTI

## (undated) DEVICE — ARM DRAPE

## (undated) DEVICE — STRAP POS MP 30X3 IN HK LOOP CLOSURE FOAM DISP

## (undated) DEVICE — SYRINGE, LUER SLIP, 20ML: Brand: MEDLINE

## (undated) DEVICE — C-ARM: Brand: UNBRANDED

## (undated) DEVICE — Z DISCONTINUED GLOVE SURG SZ 7.5 L12IN FNGR THK13MIL WHT ISOLEX

## (undated) DEVICE — TROCAR: Brand: KII FIOS FIRST ENTRY

## (undated) DEVICE — SUTURE PDS II SZ 0 L27IN ABSRB VLT UR-6 L26MM 1/2 CIR D7185

## (undated) DEVICE — Z INACTIVE USE 2653177 SPONGE GZ W2XL2IN NONWOVEN 4 PLY FASTER WICKING ABIL AVANT

## (undated) DEVICE — GLOVE SURG SZ 85 L12IN FNGR THK79MIL GRN LTX FREE

## (undated) DEVICE — BLADELESS OBTURATOR: Brand: WECK VISTA